# Patient Record
Sex: FEMALE | Race: ASIAN | Employment: UNEMPLOYED | URBAN - METROPOLITAN AREA
[De-identification: names, ages, dates, MRNs, and addresses within clinical notes are randomized per-mention and may not be internally consistent; named-entity substitution may affect disease eponyms.]

---

## 2017-01-03 RX ORDER — LEVOTHYROXINE SODIUM 0.05 MG/1
TABLET ORAL
Qty: 30 TABLET | Refills: 0 | Status: SHIPPED | OUTPATIENT
Start: 2017-01-03 | End: 2017-01-23 | Stop reason: SDUPTHER

## 2017-01-24 RX ORDER — LEVOTHYROXINE SODIUM 0.05 MG/1
TABLET ORAL
Qty: 90 TABLET | Refills: 0 | Status: SHIPPED | OUTPATIENT
Start: 2017-01-24 | End: 2017-05-01 | Stop reason: SDUPTHER

## 2017-02-06 DIAGNOSIS — M17.0 PRIMARY OSTEOARTHRITIS OF BOTH KNEES: Primary | ICD-10-CM

## 2017-02-10 ENCOUNTER — TELEPHONE (OUTPATIENT)
Dept: ORTHOPEDIC SURGERY | Age: 57
End: 2017-02-10

## 2017-02-13 ENCOUNTER — TELEPHONE (OUTPATIENT)
Dept: ORTHOPEDIC SURGERY | Age: 57
End: 2017-02-13

## 2017-02-27 ENCOUNTER — OFFICE VISIT (OUTPATIENT)
Dept: ORTHOPEDIC SURGERY | Age: 57
End: 2017-02-27

## 2017-02-27 DIAGNOSIS — M17.0 PRIMARY OSTEOARTHRITIS OF BOTH KNEES: Primary | ICD-10-CM

## 2017-02-27 PROCEDURE — 20610 DRAIN/INJ JOINT/BURSA W/O US: CPT | Performed by: ORTHOPAEDIC SURGERY

## 2017-03-23 RX ORDER — FLUTICASONE PROPIONATE 50 MCG
SPRAY, SUSPENSION (ML) NASAL
Qty: 16 G | Refills: 1 | Status: SHIPPED | OUTPATIENT
Start: 2017-03-23 | End: 2018-02-23 | Stop reason: SDUPTHER

## 2017-05-01 RX ORDER — LEVOTHYROXINE SODIUM 0.05 MG/1
TABLET ORAL
Qty: 90 TABLET | Refills: 0 | Status: SHIPPED | OUTPATIENT
Start: 2017-05-01 | End: 2017-07-22 | Stop reason: SDUPTHER

## 2017-07-24 RX ORDER — LEVOTHYROXINE SODIUM 0.05 MG/1
TABLET ORAL
Qty: 90 TABLET | Refills: 0 | Status: SHIPPED | OUTPATIENT
Start: 2017-07-24 | End: 2017-10-10 | Stop reason: SDUPTHER

## 2017-08-15 ENCOUNTER — OFFICE VISIT (OUTPATIENT)
Dept: FAMILY MEDICINE CLINIC | Age: 57
End: 2017-08-15

## 2017-08-15 VITALS
SYSTOLIC BLOOD PRESSURE: 118 MMHG | WEIGHT: 153 LBS | DIASTOLIC BLOOD PRESSURE: 84 MMHG | BODY MASS INDEX: 26.26 KG/M2 | HEART RATE: 58 BPM

## 2017-08-15 DIAGNOSIS — M54.6 ACUTE LEFT-SIDED THORACIC BACK PAIN: ICD-10-CM

## 2017-08-15 DIAGNOSIS — M54.6 ACUTE LEFT-SIDED THORACIC BACK PAIN: Primary | ICD-10-CM

## 2017-08-15 LAB
ANION GAP SERPL CALCULATED.3IONS-SCNC: 17 MMOL/L (ref 3–16)
BILIRUBIN, POC: NORMAL
BLOOD URINE, POC: NORMAL
BUN BLDV-MCNC: 16 MG/DL (ref 7–20)
CALCIUM SERPL-MCNC: 9.8 MG/DL (ref 8.3–10.6)
CHLORIDE BLD-SCNC: 102 MMOL/L (ref 99–110)
CLARITY, POC: NORMAL
CO2: 25 MMOL/L (ref 21–32)
COLOR, POC: NORMAL
CREAT SERPL-MCNC: 0.8 MG/DL (ref 0.6–1.1)
GFR AFRICAN AMERICAN: >60
GFR NON-AFRICAN AMERICAN: >60
GLUCOSE BLD-MCNC: 104 MG/DL (ref 70–99)
GLUCOSE URINE, POC: NORMAL
HCT VFR BLD CALC: 37.3 % (ref 36–48)
HEMOGLOBIN: 12.1 G/DL (ref 12–16)
KETONES, POC: NORMAL
LEUKOCYTE EST, POC: NORMAL
MCH RBC QN AUTO: 26.5 PG (ref 26–34)
MCHC RBC AUTO-ENTMCNC: 32.3 G/DL (ref 31–36)
MCV RBC AUTO: 82.2 FL (ref 80–100)
NITRITE, POC: NORMAL
PDW BLD-RTO: 13.6 % (ref 12.4–15.4)
PH, POC: 6.5
PLATELET # BLD: 193 K/UL (ref 135–450)
PMV BLD AUTO: 10.2 FL (ref 5–10.5)
POTASSIUM SERPL-SCNC: 5 MMOL/L (ref 3.5–5.1)
PROTEIN, POC: NORMAL
RBC # BLD: 4.54 M/UL (ref 4–5.2)
SODIUM BLD-SCNC: 144 MMOL/L (ref 136–145)
SPECIFIC GRAVITY, POC: 1.01
UROBILINOGEN, POC: 0.2
WBC # BLD: 4.6 K/UL (ref 4–11)

## 2017-08-15 PROCEDURE — 99214 OFFICE O/P EST MOD 30 MIN: CPT | Performed by: FAMILY MEDICINE

## 2017-08-15 PROCEDURE — 81002 URINALYSIS NONAUTO W/O SCOPE: CPT | Performed by: FAMILY MEDICINE

## 2017-08-21 ASSESSMENT — ENCOUNTER SYMPTOMS
EYES NEGATIVE: 1
RESPIRATORY NEGATIVE: 1
GASTROINTESTINAL NEGATIVE: 1

## 2017-08-22 ENCOUNTER — HOSPITAL ENCOUNTER (OUTPATIENT)
Dept: CT IMAGING | Age: 57
Discharge: HOME | End: 2017-08-22
Attending: FAMILY MEDICINE | Admitting: FAMILY MEDICINE

## 2017-08-22 ENCOUNTER — TELEPHONE (OUTPATIENT)
Dept: FAMILY MEDICINE CLINIC | Age: 57
End: 2017-08-22

## 2017-08-22 DIAGNOSIS — M54.6 ACUTE LEFT-SIDED THORACIC BACK PAIN: ICD-10-CM

## 2017-08-22 DIAGNOSIS — M54.6 PAIN IN THORACIC SPINE: ICD-10-CM

## 2017-08-25 ENCOUNTER — TELEPHONE (OUTPATIENT)
Dept: FAMILY MEDICINE CLINIC | Age: 57
End: 2017-08-25

## 2017-08-28 DIAGNOSIS — M17.0 PRIMARY OSTEOARTHRITIS OF BOTH KNEES: Primary | ICD-10-CM

## 2017-09-22 ENCOUNTER — OFFICE VISIT (OUTPATIENT)
Dept: ORTHOPEDIC SURGERY | Age: 57
End: 2017-09-22

## 2017-09-22 VITALS — BODY MASS INDEX: 25.1 KG/M2 | HEIGHT: 64 IN | WEIGHT: 147 LBS

## 2017-09-22 DIAGNOSIS — M25.562 PAIN IN BOTH KNEES, UNSPECIFIED CHRONICITY: Primary | ICD-10-CM

## 2017-09-22 DIAGNOSIS — M25.561 PAIN IN BOTH KNEES, UNSPECIFIED CHRONICITY: Primary | ICD-10-CM

## 2017-09-22 DIAGNOSIS — M17.0 PRIMARY OSTEOARTHRITIS OF BOTH KNEES: ICD-10-CM

## 2017-09-22 PROCEDURE — 99214 OFFICE O/P EST MOD 30 MIN: CPT | Performed by: ORTHOPAEDIC SURGERY

## 2017-09-22 PROCEDURE — 73564 X-RAY EXAM KNEE 4 OR MORE: CPT | Performed by: ORTHOPAEDIC SURGERY

## 2017-09-28 ENCOUNTER — TELEPHONE (OUTPATIENT)
Dept: ORTHOPEDIC SURGERY | Age: 57
End: 2017-09-28

## 2017-10-03 ENCOUNTER — OFFICE VISIT (OUTPATIENT)
Dept: ORTHOPEDIC SURGERY | Age: 57
End: 2017-10-03

## 2017-10-03 DIAGNOSIS — M17.0 PRIMARY OSTEOARTHRITIS OF BOTH KNEES: Primary | ICD-10-CM

## 2017-10-03 PROCEDURE — 20610 DRAIN/INJ JOINT/BURSA W/O US: CPT | Performed by: ORTHOPAEDIC SURGERY

## 2017-10-03 PROCEDURE — 99999 PR OFFICE/OUTPT VISIT,PROCEDURE ONLY: CPT | Performed by: ORTHOPAEDIC SURGERY

## 2017-10-03 NOTE — PROGRESS NOTES
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 HISTORY OF PRESENT ILLNESS: The patient is here for the 1st Synvisc 1 injection into the left and right knee. PROCEDURE: Under sterile conditions, the patient was injected in the superolateral pouch of the left and right knee with Synvisc 1 prefilled syringe with a 22-gauge needle. The injection was tolerated well. Post-injection precautions were given. PLAN: The patient will follow up with us in clinic in three months to check to see how they responded to the injections.

## 2017-10-03 NOTE — MR AVS SNAPSHOT
After Visit Summary             Dorian Velasquez   10/3/2017 10:10 AM   Office Visit    Description:  Female : 1960   Provider:  Mami Gary MD   Department:  [facility]              Your Follow-Up and Future Appointments         Below is a list of your follow-up and future appointments. This may not be a complete list as you may have made appointments directly with providers that we are not aware of or your providers may have made some for you. Please call your providers to confirm appointments. It is important to keep your appointments. Please bring your current insurance card, photo ID, co-pay, and all medication bottles to your appointment. If self-pay, payment is expected at the time of service. Information from Your Visit        Department     Name Address Phone Fax    [facility] [address]  [address] [address],[address] [facility] [address] [facility] 02 [phone]      You Were Seen for:         Comments    Primary osteoarthritis of both knees   [089520]         Vital Signs     Last Menstrual Period Smoking Status                2014 Never Smoker          Additional Information about your Body Mass Index (BMI)           Your BMI as listed above is considered overweight (25.0-29.9). BMI is an estimate of body fat, calculated from your height and weight. The higher your BMI, the greater your risk of heart disease, high blood pressure, type 2 diabetes, stroke, gallstones, arthritis, sleep apnea, and certain cancers. BMI is not perfect. It may overestimate body fat in athletes and people who are more muscular. If your body fat is high you can improve your BMI by decreasing your calorie intake and becoming more physically active.      Learn more at: redITco.uk             Medications and Orders      Your Current Medications Are levothyroxine (SYNTHROID) 50 MCG tablet TAKE ONE TABLET BY MOUTH DAILY    fluticasone (FLONASE) 50 MCG/ACT nasal spray USE 2 SPRAYS IN EACH NOSTRIL DAILY    omeprazole (PRILOSEC) 40 MG capsule Take 1 capsule by mouth daily Take 1 capsule twice daily, in the morning and 1 hour before dinner    EPINEPHrine (EPIPEN 2-DEVAN) 0.3 MG/0.3ML JESS injection Inject 0.3 mLs into the muscle once for 1 dose. Use as directed for allergic reaction    Calcium Carbonate-Vitamin D (CALCIUM + D PO) Take  by mouth.         Allergies              Latex Hives    Advil [Ibuprofen Micronized]     Aspirin     Demerol     Estrogens Conjugated     Joint pain    Hydrocodone Hives, Swelling    Macrobid [Nitrofurantoin Monohydrate Macrocrystals]     Penicillins     Prednisone     Vicodin [Hydrocodone-acetaminophen]     Zithromax [Azithromycin Dihydrate]     Zyrtec [Cetirizine Hcl]     Iodine Rash      We Ordered/Performed the following           CT ARTHROCENTESIS ASPIR&/INJ MAJOR JT/BURSA W/O US     CT SYNVISC OR SYNVISC-ONE          Additional Information        Basic Information     Date Of Birth Sex Race Ethnicity Preferred Language    1960 Female  Non-/Non  English      Problem List as of 10/3/2017  Date Reviewed: 10/3/2017                Primary osteoarthritis of both knees    Acquired hypothyroidism    Primary localized osteoarthrosis, lower leg    Leg weakness    Medial meniscus tear    Ankle joint effusion    Osteoarthritis, knee    Pain in joint, lower leg    Arthritis    IBS (irritable bowel syndrome)    Raynaud disease    Asthma, currently dormant    Anemia      Immunizations as of 10/3/2017     Name Date    Hepatitis A 4/16/2013    Hepatitis B 4/15/2013    Influenza Virus Vaccine 10/1/2013, 10/28/2011    Influenza, Intradermal, Preservative free 10/16/2015    Tdap (Boostrix, Adacel) 12/30/2013      Preventive Care        Date Due    HIV screening is recommended for all people regardless of risk factors aged 15-65 years at least once (lifetime) who have never been HIV tested. 1/17/1977    Diabetes Screening 1/17/2002    Mammograms are recommended every 2 years for low/average risk patients aged 48 - 69, and every year for high risk patients per updated national guidelines. However these guidelines can be individualized by your provider. 12/30/2016    Yearly Flu Vaccine (1) 9/1/2017    Pap Smear 12/19/2019    Cholesterol Screening 12/29/2019    Tetanus Combination Vaccine (2 - Td) 12/30/2023    Colonoscopy 9/7/2027            DocDept Signup           Our records indicate that you have an active "[employer] account. You can view your After Visit Summary by going to [email or web address] and logging in with your "[employer] username and password. If you don't have a "[employer] username and password but a parent or guardian has access to your record, the parent or guardian should login with their own "[employer] username and password and access your record to view the After Visit Summary. Additional Information  If you have questions, please contact the physician practice where you receive care. Remember, "[employer] is NOT to be used for urgent needs. For medical emergencies, dial 911. For questions regarding your "[employer] account call [phone]. If you have a clinical question, please call your doctor's office.

## 2017-10-10 RX ORDER — LEVOTHYROXINE SODIUM 0.05 MG/1
TABLET ORAL
Qty: 90 TABLET | Refills: 0 | Status: SHIPPED | OUTPATIENT
Start: 2017-10-10 | End: 2018-01-08 | Stop reason: SDUPTHER

## 2017-11-03 ENCOUNTER — TELEPHONE (OUTPATIENT)
Dept: FAMILY MEDICINE CLINIC | Age: 57
End: 2017-11-03

## 2017-11-03 NOTE — TELEPHONE ENCOUNTER
Pt had a CT of the Abdomen. She received a bill from [facility] [employer]..  She says that [facility] did not pay for any of it. Did our office send a PA for the CT?     Last office visit 8/15/17

## 2017-11-06 NOTE — TELEPHONE ENCOUNTER
You can call [facility] and ask for the precert dept. They should be able to help you.

## 2018-01-04 ENCOUNTER — TELEPHONE (OUTPATIENT)
Dept: ORTHOPEDIC SURGERY | Age: 58
End: 2018-01-04

## 2018-01-04 NOTE — TELEPHONE ENCOUNTER
Scanned complete [facility] medical records under Media tab for patient pick-up @ [facility]. Patient is also picking up an X-Ray/MRI CD.

## 2018-01-08 RX ORDER — LEVOTHYROXINE SODIUM 0.05 MG/1
TABLET ORAL
Qty: 90 TABLET | Refills: 0 | Status: SHIPPED | OUTPATIENT
Start: 2018-01-08 | End: 2018-04-17 | Stop reason: SDUPTHER

## 2018-02-05 ENCOUNTER — TELEPHONE (OUTPATIENT)
Dept: FAMILY MEDICINE CLINIC | Age: 58
End: 2018-02-05

## 2018-02-07 ENCOUNTER — OFFICE VISIT (OUTPATIENT)
Dept: FAMILY MEDICINE CLINIC | Age: 58
End: 2018-02-07

## 2018-02-07 VITALS
SYSTOLIC BLOOD PRESSURE: 146 MMHG | HEART RATE: 71 BPM | BODY MASS INDEX: 26.46 KG/M2 | DIASTOLIC BLOOD PRESSURE: 90 MMHG | WEIGHT: 155 LBS | TEMPERATURE: 98.6 F

## 2018-02-07 DIAGNOSIS — J40 BRONCHITIS: Primary | ICD-10-CM

## 2018-02-07 DIAGNOSIS — K52.9 ENTERITIS: ICD-10-CM

## 2018-02-07 LAB
BASOPHILS ABSOLUTE: 0 K/UL (ref 0–0.2)
BASOPHILS RELATIVE PERCENT: 0.4 %
EOSINOPHILS ABSOLUTE: 0 K/UL (ref 0–0.6)
EOSINOPHILS RELATIVE PERCENT: 0.7 %
HCT VFR BLD CALC: 39.3 % (ref 36–48)
HEMOGLOBIN: 12.7 G/DL (ref 12–16)
LYMPHOCYTES ABSOLUTE: 2.7 K/UL (ref 1–5.1)
LYMPHOCYTES RELATIVE PERCENT: 60 %
MCH RBC QN AUTO: 26.3 PG (ref 26–34)
MCHC RBC AUTO-ENTMCNC: 32.2 G/DL (ref 31–36)
MCV RBC AUTO: 81.5 FL (ref 80–100)
MONOCYTES ABSOLUTE: 0.5 K/UL (ref 0–1.3)
MONOCYTES RELATIVE PERCENT: 10.5 %
NEUTROPHILS ABSOLUTE: 1.3 K/UL (ref 1.7–7.7)
NEUTROPHILS RELATIVE PERCENT: 28.4 %
PDW BLD-RTO: 14.1 % (ref 12.4–15.4)
PLATELET # BLD: 181 K/UL (ref 135–450)
PMV BLD AUTO: 10.2 FL (ref 5–10.5)
RBC # BLD: 4.82 M/UL (ref 4–5.2)
WBC # BLD: 4.4 K/UL (ref 4–11)

## 2018-02-07 PROCEDURE — 99213 OFFICE O/P EST LOW 20 MIN: CPT | Performed by: FAMILY MEDICINE

## 2018-02-07 RX ORDER — BENZONATATE 200 MG/1
200 CAPSULE ORAL 3 TIMES DAILY PRN
Qty: 30 CAPSULE | Refills: 0 | Status: SHIPPED | OUTPATIENT
Start: 2018-02-07 | End: 2018-02-14

## 2018-02-09 ENCOUNTER — TELEPHONE (OUTPATIENT)
Dept: FAMILY MEDICINE CLINIC | Age: 58
End: 2018-02-09

## 2018-02-09 DIAGNOSIS — K52.9 ENTERITIS: Primary | ICD-10-CM

## 2018-02-19 ENCOUNTER — TELEPHONE (OUTPATIENT)
Dept: FAMILY MEDICINE CLINIC | Age: 58
End: 2018-02-19

## 2018-02-19 DIAGNOSIS — R19.7 DIARRHEA, UNSPECIFIED TYPE: Primary | ICD-10-CM

## 2018-02-19 LAB
ANAEROBIC CULTURE: NORMAL
WOUND/ABSCESS: NORMAL

## 2018-02-19 NOTE — TELEPHONE ENCOUNTER
Not sure what is she talking about, I don't see an order from  for stool culture , I didn't see the pt

## 2018-02-19 NOTE — TELEPHONE ENCOUNTER
They only have orders for tissue samples. Please send over orders for the stool cultures that were sent to them.     Last office visit 2/7/18

## 2018-02-20 ENCOUNTER — TELEPHONE (OUTPATIENT)
Dept: FAMILY MEDICINE CLINIC | Age: 58
End: 2018-02-20

## 2018-02-20 LAB
CRYPTOSPORIDIUM ANTIGEN STOOL: NORMAL
E HISTOLYTICA ANTIGEN STOOL: NORMAL
GI BACTERIAL PATHOGENS BY PCR: NORMAL
GIARDIA ANTIGEN STOOL: NORMAL
REASON FOR REJECTION: NORMAL
REJECTED TEST: NORMAL
WHITE BLOOD CELLS (WBC), STOOL: NORMAL

## 2018-02-23 RX ORDER — FLUTICASONE PROPIONATE 50 MCG
SPRAY, SUSPENSION (ML) NASAL
Qty: 16 G | Refills: 1 | Status: SHIPPED | OUTPATIENT
Start: 2018-02-23 | End: 2019-12-26 | Stop reason: SDUPTHER

## 2018-03-08 ENCOUNTER — TELEPHONE (OUTPATIENT)
Dept: FAMILY MEDICINE CLINIC | Age: 58
End: 2018-03-08

## 2018-03-08 NOTE — TELEPHONE ENCOUNTER
Patient calling to advise that she is doing much better, and she took 2 stool samples and they came back ok.  If things changes she will contact the office

## 2018-03-19 ENCOUNTER — TELEPHONE (OUTPATIENT)
Dept: FAMILY MEDICINE CLINIC | Age: 58
End: 2018-03-19

## 2018-03-19 RX ORDER — FLUCONAZOLE 150 MG/1
150 TABLET ORAL ONCE
Qty: 1 TABLET | Refills: 0 | Status: SHIPPED | OUTPATIENT
Start: 2018-03-19 | End: 2018-03-19

## 2018-04-17 RX ORDER — LEVOTHYROXINE SODIUM 0.05 MG/1
TABLET ORAL
Qty: 90 TABLET | Refills: 0 | Status: SHIPPED | OUTPATIENT
Start: 2018-04-17 | End: 2018-07-10 | Stop reason: SDUPTHER

## 2018-05-16 ENCOUNTER — TELEPHONE (OUTPATIENT)
Dept: FAMILY MEDICINE CLINIC | Age: 58
End: 2018-05-16

## 2018-07-10 RX ORDER — LEVOTHYROXINE SODIUM 0.05 MG/1
TABLET ORAL
Qty: 90 TABLET | Refills: 0 | Status: SHIPPED | OUTPATIENT
Start: 2018-07-10 | End: 2018-07-26 | Stop reason: SDUPTHER

## 2018-07-20 ENCOUNTER — TELEPHONE (OUTPATIENT)
Dept: FAMILY MEDICINE CLINIC | Age: 58
End: 2018-07-20

## 2018-07-20 DIAGNOSIS — E03.9 ACQUIRED HYPOTHYROIDISM: Primary | ICD-10-CM

## 2018-07-24 DIAGNOSIS — E03.9 ACQUIRED HYPOTHYROIDISM: ICD-10-CM

## 2018-07-24 LAB — TSH SERPL DL<=0.05 MIU/L-ACNC: 1.53 UIU/ML (ref 0.27–4.2)

## 2018-07-26 ENCOUNTER — TELEPHONE (OUTPATIENT)
Dept: FAMILY MEDICINE CLINIC | Age: 58
End: 2018-07-26

## 2018-07-26 ENCOUNTER — OFFICE VISIT (OUTPATIENT)
Dept: FAMILY MEDICINE CLINIC | Age: 58
End: 2018-07-26

## 2018-07-26 VITALS
HEART RATE: 68 BPM | WEIGHT: 155.6 LBS | BODY MASS INDEX: 26.56 KG/M2 | OXYGEN SATURATION: 98 % | SYSTOLIC BLOOD PRESSURE: 158 MMHG | DIASTOLIC BLOOD PRESSURE: 90 MMHG | RESPIRATION RATE: 14 BRPM

## 2018-07-26 DIAGNOSIS — R03.0 PREHYPERTENSION: ICD-10-CM

## 2018-07-26 DIAGNOSIS — Z23 NEED FOR PROPHYLACTIC VACCINATION AND INOCULATION AGAINST VARICELLA: ICD-10-CM

## 2018-07-26 DIAGNOSIS — E03.9 ACQUIRED HYPOTHYROIDISM: Primary | ICD-10-CM

## 2018-07-26 PROCEDURE — 99213 OFFICE O/P EST LOW 20 MIN: CPT | Performed by: FAMILY MEDICINE

## 2018-07-26 RX ORDER — LEVOTHYROXINE SODIUM 0.05 MG/1
50 TABLET ORAL DAILY
Qty: 90 TABLET | Refills: 2 | Status: SHIPPED | OUTPATIENT
Start: 2018-07-26 | End: 2019-04-23 | Stop reason: SDUPTHER

## 2018-07-26 ASSESSMENT — PATIENT HEALTH QUESTIONNAIRE - PHQ9
2. FEELING DOWN, DEPRESSED OR HOPELESS: 0
1. LITTLE INTEREST OR PLEASURE IN DOING THINGS: 0
SUM OF ALL RESPONSES TO PHQ9 QUESTIONS 1 & 2: 0
SUM OF ALL RESPONSES TO PHQ QUESTIONS 1-9: 0

## 2018-07-26 NOTE — TELEPHONE ENCOUNTER
Pt wants to know if she should cancel her appt today she stated that she got her labs back and her TSH is normal range so she wanted to ask if she should now cancel today at 11:15 stated that the medication is working well.   Please call her at [phone]

## 2018-07-26 NOTE — PATIENT INSTRUCTIONS
problems. It's also a good idea to know your test results and keep a list of the medicines you take. How can you care for yourself at home? Following the DASH diet  · Eat 4 to 5 servings of fruit each day. A serving is 1 medium-sized piece of fruit, ½ cup chopped or canned fruit, 1/4 cup dried fruit, or 4 ounces (½ cup) of fruit juice. Choose fruit more often than fruit juice. · Eat 4 to 5 servings of vegetables each day. A serving is 1 cup of lettuce or raw leafy vegetables, ½ cup of chopped or cooked vegetables, or 4 ounces (½ cup) of vegetable juice. Choose vegetables more often than vegetable juice. · Get 2 to 3 servings of low-fat and fat-free dairy each day. A serving is 8 ounces of milk, 1 cup of yogurt, or 1 ½ ounces of cheese. · Eat 6 to 8 servings of grains each day. A serving is 1 slice of bread, 1 ounce of dry cereal, or ½ cup of cooked rice, pasta, or cooked cereal. Try to choose whole-grain products as much as possible. · Limit lean meat, poultry, and fish to 2 servings each day. A serving is 3 ounces, about the size of a deck of cards. · Eat 4 to 5 servings of nuts, seeds, and legumes (cooked dried beans, lentils, and split peas) each week. A serving is 1/3 cup of nuts, 2 tablespoons of seeds, or ½ cup of cooked beans or peas. · Limit fats and oils to 2 to 3 servings each day. A serving is 1 teaspoon of vegetable oil or 2 tablespoons of salad dressing. · Limit sweets and added sugars to 5 servings or less a week. A serving is 1 tablespoon jelly or jam, ½ cup sorbet, or 1 cup of lemonade. · Eat less than 2,300 milligrams (mg) of sodium a day. If you limit your sodium to 1,500 mg a day, you can lower your blood pressure even more. Tips for success  · Start small. Do not try to make dramatic changes to your diet all at once. You might feel that you are missing out on your favorite foods and then be more likely to not follow the plan. Make small changes, and stick with them.  Once those changes

## 2018-07-26 NOTE — PROGRESS NOTES
History Main Topics    Smoking status: Never Smoker    Smokeless tobacco: Never Used    Alcohol use No    Drug use: No    Sexual activity: Not Asked     Other Topics Concern    None     Social History Narrative    None     Current Outpatient Prescriptions   Medication Sig Dispense Refill    zoster recombinant adjuvanted vaccine (SHINGRIX) 50 MCG SUSR injection 50 MCG IM then repeat 2-6 months. 0.5 mL 1    levothyroxine (SYNTHROID) 50 MCG tablet Take 1 tablet by mouth Daily 90 tablet 2    fluticasone (FLONASE) 50 MCG/ACT nasal spray USE 2 SPRAYS IN EACH NOSTRIL DAILY 16 g 1    Calcium Carbonate-Vitamin D (CALCIUM + D PO) Take  by mouth.  omeprazole (PRILOSEC) 40 MG capsule Take 1 capsule by mouth daily Take 1 capsule twice daily, in the morning and 1 hour before dinner 60 capsule 2    EPINEPHrine (EPIPEN 2-DEVAN) 0.3 MG/0.3ML JESS injection Inject 0.3 mLs into the muscle once for 1 dose. Use as directed for allergic reaction 2 Device 1     No current facility-administered medications for this visit. Allergies   Allergen Reactions    Latex Hives    Advil [Ibuprofen Micronized]     Aspirin     Demerol     Estrogens Conjugated      Joint pain      Hydrocodone Hives and Swelling    Macrobid [Nitrofurantoin Monohydrate Macrocrystals]     Penicillins     Prednisone     Vicodin [Hydrocodone-Acetaminophen]     Zithromax [Azithromycin Dihydrate]     Zyrtec [Cetirizine Hcl]     Iodine Rash       Review of Systems   Constitutional: Negative. HENT: Negative. Eyes: Negative. Respiratory: Negative. Cardiovascular: Negative. Gastrointestinal: Negative. All other systems reviewed and are negative. OBJECTIVE:  BP (!) 158/90 (Site: Left Arm, Position: Sitting, Cuff Size: Small Adult)   Pulse 68   Resp 14   Wt 155 lb 9.6 oz (70.6 kg)   LMP 01/23/2014   SpO2 98%   Breastfeeding?  No   BMI 26.56 kg/m²     Physical Exam   Constitutional: She is oriented to person, place, and time. She appears well-developed and well-nourished. HENT:   Mouth/Throat: Oropharynx is clear and moist.   Eyes: Conjunctivae are normal. No scleral icterus. Neck: No JVD present. No thyromegaly present. Cardiovascular: Normal rate, regular rhythm, normal heart sounds and intact distal pulses. Exam reveals no gallop and no friction rub. No murmur heard. Pulmonary/Chest: Effort normal and breath sounds normal. She has no wheezes. She has no rales. Abdominal: Soft. Bowel sounds are normal. She exhibits no distension and no mass. There is no hepatosplenomegaly. There is no tenderness. No hernia. Musculoskeletal: She exhibits no edema. Lymphadenopathy:     She has no cervical adenopathy. Neurological: She is alert and oriented to person, place, and time. Skin: No rash noted. Psychiatric: She has a normal mood and affect. Vitals reviewed. ASSESSMENT:   Diagnosis Orders   1. Acquired hypothyroidism     2. Need for prophylactic vaccination and inoculation against varicella  zoster recombinant adjuvanted vaccine (SHINGRIX) 50 MCG SUSR injection   3.  Prehypertension         PLAN:    See orders, monitor blood pressure low sodium intake ambulatory recheck close follow-up if needed  Information is given  Continue the same with thyroid medication

## 2018-07-27 ENCOUNTER — TELEPHONE (OUTPATIENT)
Dept: FAMILY MEDICINE CLINIC | Age: 58
End: 2018-07-27

## 2018-07-27 ASSESSMENT — ENCOUNTER SYMPTOMS
EYES NEGATIVE: 1
RESPIRATORY NEGATIVE: 1
GASTROINTESTINAL NEGATIVE: 1

## 2018-07-27 NOTE — TELEPHONE ENCOUNTER
Pt was in yesterday and she was told she has HTN. She is calling with her BP reading today. Pt is calling today with her reading. It is 159/95. She is not sure how often she is to call with readings. Let her know please.     LOV 7/27/18

## 2018-08-07 ENCOUNTER — OFFICE VISIT (OUTPATIENT)
Dept: FAMILY MEDICINE CLINIC | Age: 58
End: 2018-08-07

## 2018-08-07 VITALS
TEMPERATURE: 98.2 F | OXYGEN SATURATION: 98 % | BODY MASS INDEX: 26.63 KG/M2 | DIASTOLIC BLOOD PRESSURE: 86 MMHG | RESPIRATION RATE: 14 BRPM | WEIGHT: 156 LBS | SYSTOLIC BLOOD PRESSURE: 138 MMHG | HEART RATE: 62 BPM

## 2018-08-07 DIAGNOSIS — H65.92 OME (OTITIS MEDIA WITH EFFUSION), LEFT: ICD-10-CM

## 2018-08-07 DIAGNOSIS — J01.00 ACUTE NON-RECURRENT MAXILLARY SINUSITIS: Primary | ICD-10-CM

## 2018-08-07 PROCEDURE — 99214 OFFICE O/P EST MOD 30 MIN: CPT | Performed by: FAMILY MEDICINE

## 2018-08-07 RX ORDER — CEFDINIR 300 MG/1
300 CAPSULE ORAL 2 TIMES DAILY
Qty: 20 CAPSULE | Refills: 0 | Status: SHIPPED | OUTPATIENT
Start: 2018-08-07 | End: 2018-08-17

## 2018-08-09 RX ORDER — AMOXICILLIN AND CLAVULANATE POTASSIUM 875; 125 MG/1; MG/1
1 TABLET, FILM COATED ORAL 2 TIMES DAILY
Qty: 20 TABLET | Refills: 0 | Status: SHIPPED | OUTPATIENT
Start: 2018-08-09 | End: 2018-08-19

## 2018-09-26 ENCOUNTER — NURSE ONLY (OUTPATIENT)
Dept: FAMILY MEDICINE CLINIC | Age: 58
End: 2018-09-26
Payer: COMMERCIAL

## 2018-09-26 DIAGNOSIS — Z23 NEED FOR INFLUENZA VACCINATION: Primary | ICD-10-CM

## 2018-09-26 PROCEDURE — 90471 IMMUNIZATION ADMIN: CPT | Performed by: FAMILY MEDICINE

## 2018-09-26 PROCEDURE — 90686 IIV4 VACC NO PRSV 0.5 ML IM: CPT | Performed by: FAMILY MEDICINE

## 2018-11-12 ENCOUNTER — OFFICE VISIT (OUTPATIENT)
Dept: FAMILY MEDICINE CLINIC | Age: 58
End: 2018-11-12
Payer: COMMERCIAL

## 2018-11-12 VITALS
DIASTOLIC BLOOD PRESSURE: 80 MMHG | WEIGHT: 159.8 LBS | HEART RATE: 59 BPM | RESPIRATION RATE: 14 BRPM | OXYGEN SATURATION: 98 % | TEMPERATURE: 98.4 F | SYSTOLIC BLOOD PRESSURE: 130 MMHG | BODY MASS INDEX: 27.28 KG/M2

## 2018-11-12 DIAGNOSIS — J30.9 ALLERGIC RHINITIS, UNSPECIFIED SEASONALITY, UNSPECIFIED TRIGGER: ICD-10-CM

## 2018-11-12 DIAGNOSIS — H66.90 ACUTE OTITIS MEDIA, UNSPECIFIED OTITIS MEDIA TYPE: Primary | ICD-10-CM

## 2018-11-12 PROCEDURE — 99214 OFFICE O/P EST MOD 30 MIN: CPT | Performed by: FAMILY MEDICINE

## 2018-11-12 RX ORDER — AMOXICILLIN AND CLAVULANATE POTASSIUM 875; 125 MG/1; MG/1
1 TABLET, FILM COATED ORAL 2 TIMES DAILY
Qty: 20 TABLET | Refills: 0 | Status: SHIPPED | OUTPATIENT
Start: 2018-11-12 | End: 2018-11-22

## 2018-11-12 RX ORDER — FLUTICASONE PROPIONATE 50 MCG
2 SPRAY, SUSPENSION (ML) NASAL DAILY
Qty: 1 BOTTLE | Refills: 1 | Status: SHIPPED | OUTPATIENT
Start: 2018-11-12 | End: 2019-06-06

## 2019-04-11 ENCOUNTER — TELEPHONE (OUTPATIENT)
Dept: FAMILY MEDICINE CLINIC | Age: 59
End: 2019-04-11

## 2019-04-11 NOTE — TELEPHONE ENCOUNTER
Pt is going to travel [facility] in June she will be there for 2 month   She needs to know what if any immunizations she will need ? And   She stated that she has been in [place] for 31 years so her immunity may be different then thiers she is from [facility] but she is concerned about being there for 2 months   Drinking Water is that ok to drink? Pt did scheduled a check up just before she leaves. please advise.

## 2019-04-11 NOTE — TELEPHONE ENCOUNTER
She should check the CDC website to see if she needs any immunization, differs do to the area she is traveling to, we'll discuss in further detail when she comes for her follow-up  she should check that website sooner

## 2019-04-11 NOTE — TELEPHONE ENCOUNTER
[phone] (home)   Spoke with patient advised per  6 Zay Gilliland to check CDC website, check prior to OV, can discuss at visit

## 2019-04-23 ENCOUNTER — OFFICE VISIT (OUTPATIENT)
Dept: FAMILY MEDICINE CLINIC | Age: 59
End: 2019-04-23
Payer: COMMERCIAL

## 2019-04-23 VITALS
WEIGHT: 164 LBS | HEART RATE: 69 BPM | BODY MASS INDEX: 28 KG/M2 | SYSTOLIC BLOOD PRESSURE: 118 MMHG | OXYGEN SATURATION: 99 % | DIASTOLIC BLOOD PRESSURE: 68 MMHG

## 2019-04-23 DIAGNOSIS — J30.2 SEASONAL ALLERGIC RHINITIS, UNSPECIFIED TRIGGER: ICD-10-CM

## 2019-04-23 DIAGNOSIS — H66.002 ACUTE SUPPURATIVE OTITIS MEDIA OF LEFT EAR WITHOUT SPONTANEOUS RUPTURE OF TYMPANIC MEMBRANE, RECURRENCE NOT SPECIFIED: Primary | ICD-10-CM

## 2019-04-23 PROCEDURE — 99214 OFFICE O/P EST MOD 30 MIN: CPT | Performed by: FAMILY MEDICINE

## 2019-04-23 RX ORDER — LEVOTHYROXINE SODIUM 0.05 MG/1
50 TABLET ORAL DAILY
Qty: 90 TABLET | Refills: 2 | Status: SHIPPED | OUTPATIENT
Start: 2019-04-23 | End: 2019-06-06 | Stop reason: SDUPTHER

## 2019-04-23 RX ORDER — LEVALBUTEROL TARTRATE 45 UG/1
2 AEROSOL, METERED ORAL EVERY 4 HOURS PRN
Qty: 1 INHALER | Refills: 0 | Status: SHIPPED | OUTPATIENT
Start: 2019-04-23 | End: 2019-12-27 | Stop reason: SDUPTHER

## 2019-04-23 RX ORDER — AMOXICILLIN AND CLAVULANATE POTASSIUM 875; 125 MG/1; MG/1
1 TABLET, FILM COATED ORAL 2 TIMES DAILY
Qty: 20 TABLET | Refills: 0 | Status: SHIPPED | OUTPATIENT
Start: 2019-04-23 | End: 2019-07-30 | Stop reason: SDUPTHER

## 2019-04-23 ASSESSMENT — PATIENT HEALTH QUESTIONNAIRE - PHQ9
SUM OF ALL RESPONSES TO PHQ9 QUESTIONS 1 & 2: 0
SUM OF ALL RESPONSES TO PHQ QUESTIONS 1-9: 0
2. FEELING DOWN, DEPRESSED OR HOPELESS: 0
SUM OF ALL RESPONSES TO PHQ QUESTIONS 1-9: 0
1. LITTLE INTEREST OR PLEASURE IN DOING THINGS: 0

## 2019-04-23 NOTE — PROGRESS NOTES
Subjective:       History was provided by the patient. Samir Llanos is a 64 y.o. female who presents for evaluation of symptoms of a URI. Symptoms include dry cough, left ear blockage, pain and pressure, headache, nasal blockage, post nasal drip, sinus and nasal congestion, bilateral sinus pain and sore throat. Onset of symptoms was 10 days ago, gradually worsening since that time. Associated symptoms include left ear pressure/pain, achiness, congestion, no  fever, post nasal drip and sinus pressure. She is drinking plenty of fluids. Evaluation to date: none.  Treatment to date: none  Past Medical History:   Diagnosis Date    Asthma     Capsulitis of shoulder 2012    left shoulder, Dr Sampson Epley Hypothyroid     IBS (irritable bowel syndrome)     Nausea & vomiting     Raynaud's disease     to feet     Patient Active Problem List    Diagnosis Date Noted    Primary osteoarthritis of both knees 06/15/2016    Acquired hypothyroidism 2016    Primary localized osteoarthrosis, lower leg 2014    Leg weakness 04/10/2014    Medial meniscus tear 2014    Ankle joint effusion 2014    Osteoarthritis, knee 2014    Pain in joint, lower leg 2014    Arthritis 2013    IBS (irritable bowel syndrome) 10/24/2012    Raynaud disease 2010    Asthma, currently dormant 2010    Anemia 2010     Past Surgical History:   Procedure Laterality Date     SECTION      COLONOSCOPY      Dr Leah Alvarado ARTHROSCOPY Left 2014    LEFT KNEE ARTHROSCOPE WITH PARTIAL MEDIAL MENISCECTOMY, SYNOVECTOMY AND CHONDROPLASTY                                SHOULDER ARTHROSCOPY  2012    left shoulder arthroscopy with subacromial decompression , distal clavicle excision ,synovectomy,labral debridement    SHOULDER ARTHROSCOPY  10/16/2012    RIGHT SHOULDER ARTHROSCOPE, SUBACROMIAL DECOMPRESSION, DISTAL    UPPER GASTROINTESTINAL ENDOSCOPY   Dr Yessenia Moreno     Family History   Problem Relation Age of Onset    Diabetes Mother     High Blood Pressure Mother     High Blood Pressure Father     Heart Disease Father     High Blood Pressure Maternal Grandfather      Social History     Socioeconomic History    Marital status:      Spouse name: Not on file    Number of children: Not on file    Years of education: Not on file    Highest education level: Not on file   Occupational History    Not on file   Social Needs    Financial resource strain: Not on file    Food insecurity:     Worry: Not on file     Inability: Not on file    Transportation needs:     Medical: Not on file     Non-medical: Not on file   Tobacco Use    Smoking status: Never Smoker    Smokeless tobacco: Never Used   Substance and Sexual Activity    Alcohol use: No    Drug use: No    Sexual activity: Not on file   Lifestyle    Physical activity:     Days per week: Not on file     Minutes per session: Not on file    Stress: Not on file   Relationships    Social connections:     Talks on phone: Not on file     Gets together: Not on file     Attends Gnosticist service: Not on file     Active member of club or organization: Not on file     Attends meetings of clubs or organizations: Not on file     Relationship status: Not on file    Intimate partner violence:     Fear of current or ex partner: Not on file     Emotionally abused: Not on file     Physically abused: Not on file     Forced sexual activity: Not on file   Other Topics Concern    Not on file   Social History Narrative    Not on file     Current Outpatient Medications   Medication Sig Dispense Refill    fluticasone (FLONASE) 50 MCG/ACT nasal spray 2 sprays by Nasal route daily 1 Bottle 1    levothyroxine (SYNTHROID) 50 MCG tablet Take 1 tablet by mouth Daily 90 tablet 2    fluticasone (FLONASE) 50 MCG/ACT nasal spray USE 2 SPRAYS IN EACH NOSTRIL DAILY 16 g 1    Calcium Carbonate-Vitamin D (CALCIUM + D PO) Take by mouth.  zoster recombinant adjuvanted vaccine (SHINGRIX) 50 MCG SUSR injection 50 MCG IM then repeat 2-6 months. 0.5 mL 1    omeprazole (PRILOSEC) 40 MG capsule Take 1 capsule by mouth daily Take 1 capsule twice daily, in the morning and 1 hour before dinner 60 capsule 2    EPINEPHrine (EPIPEN 2-DEVAN) 0.3 MG/0.3ML JESS injection Inject 0.3 mLs into the muscle once for 1 dose. Use as directed for allergic reaction 2 Device 1     No current facility-administered medications for this visit. Current Outpatient Medications on File Prior to Visit   Medication Sig Dispense Refill    fluticasone (FLONASE) 50 MCG/ACT nasal spray 2 sprays by Nasal route daily 1 Bottle 1    levothyroxine (SYNTHROID) 50 MCG tablet Take 1 tablet by mouth Daily 90 tablet 2    fluticasone (FLONASE) 50 MCG/ACT nasal spray USE 2 SPRAYS IN EACH NOSTRIL DAILY 16 g 1    Calcium Carbonate-Vitamin D (CALCIUM + D PO) Take  by mouth.  zoster recombinant adjuvanted vaccine (SHINGRIX) 50 MCG SUSR injection 50 MCG IM then repeat 2-6 months. 0.5 mL 1    omeprazole (PRILOSEC) 40 MG capsule Take 1 capsule by mouth daily Take 1 capsule twice daily, in the morning and 1 hour before dinner 60 capsule 2    EPINEPHrine (EPIPEN 2-DEVAN) 0.3 MG/0.3ML JESS injection Inject 0.3 mLs into the muscle once for 1 dose. Use as directed for allergic reaction 2 Device 1     No current facility-administered medications on file prior to visit. Allergies   Allergen Reactions    Latex Hives    Advil [Ibuprofen Micronized]     Aspirin     Demerol     Estrogens Conjugated      Joint pain      Hydrocodone Hives and Swelling    Macrobid [Nitrofurantoin Monohydrate Macrocrystals]     Prednisone     Vicodin [Hydrocodone-Acetaminophen]     Zithromax [Azithromycin Dihydrate]     Zyrtec [Cetirizine Hcl]     Iodine Rash       Review of Systems  Pertinent items are noted in HPI.       Objective:      /68 (Site: Left Upper Arm, Position: Sitting, Cuff Size: Medium Adult)   Pulse 69   Wt 164 lb (74.4 kg)   LMP 01/23/2014   SpO2 99%   BMI 28.00 kg/m²   General appearance: alert, appears stated age and cooperative  Head: Normocephalic, without obvious abnormality, atraumatic  Eyes: conjunctivae/corneas clear. PERRL, EOM's intact. Fundi benign. Ears: normal TM and external ear canal right ear and abnormal TM right ear - erythematous and dull  Nose: Nares normal. Septum midline. Mucosa normal. No drainage or sinus tenderness. , moderate congestion  Throat: lips, mucosa, and tongue normal; teeth and gums normal  Neck: no adenopathy, no carotid bruit, no JVD, supple, symmetrical, trachea midline and thyroid not enlarged, symmetric, no tenderness/mass/nodules  Lungs: clear to auscultation bilaterally  Heart: regular rate and rhythm, S1, S2 normal, no murmur, click, rub or gallop         Assessment:      otitis media and allergic rhinitis      Plan:      Suggested symptomatic OTC remedies. Nasal saline sprays for congestion. RTC prn.     See orders

## 2019-05-20 ENCOUNTER — TELEPHONE (OUTPATIENT)
Dept: FAMILY MEDICINE CLINIC | Age: 59
End: 2019-05-20

## 2019-06-03 NOTE — TELEPHONE ENCOUNTER
Pt is leaving [facility] on June 11th. She is asking for a refill of Levothyroxine 50 mcg. She is stating she will not have enough pills to get her through to when she comes back home. Pt informed it has been too long since she has been here for a thyroid check.  Pt scheduled for 6/11/19

## 2019-06-06 ENCOUNTER — OFFICE VISIT (OUTPATIENT)
Dept: FAMILY MEDICINE CLINIC | Age: 59
End: 2019-06-06
Payer: COMMERCIAL

## 2019-06-06 VITALS
DIASTOLIC BLOOD PRESSURE: 98 MMHG | WEIGHT: 154 LBS | BODY MASS INDEX: 26.29 KG/M2 | RESPIRATION RATE: 12 BRPM | OXYGEN SATURATION: 98 % | HEART RATE: 74 BPM | SYSTOLIC BLOOD PRESSURE: 138 MMHG

## 2019-06-06 DIAGNOSIS — J30.9 ALLERGIC RHINITIS, UNSPECIFIED SEASONALITY, UNSPECIFIED TRIGGER: ICD-10-CM

## 2019-06-06 DIAGNOSIS — I10 ESSENTIAL HYPERTENSION: ICD-10-CM

## 2019-06-06 DIAGNOSIS — E03.9 ACQUIRED HYPOTHYROIDISM: Primary | ICD-10-CM

## 2019-06-06 LAB — TSH SERPL DL<=0.05 MIU/L-ACNC: 1.52 UIU/ML (ref 0.27–4.2)

## 2019-06-06 PROCEDURE — 99213 OFFICE O/P EST LOW 20 MIN: CPT | Performed by: FAMILY MEDICINE

## 2019-06-06 PROCEDURE — 36415 COLL VENOUS BLD VENIPUNCTURE: CPT | Performed by: FAMILY MEDICINE

## 2019-06-06 RX ORDER — FLUTICASONE PROPIONATE 50 MCG
SPRAY, SUSPENSION (ML) NASAL
Qty: 1 BOTTLE | Refills: 0 | Status: SHIPPED | OUTPATIENT
Start: 2019-06-06 | End: 2019-09-12

## 2019-06-06 RX ORDER — LEVOTHYROXINE SODIUM 0.05 MG/1
50 TABLET ORAL DAILY
Qty: 90 TABLET | Refills: 1 | Status: SHIPPED | OUTPATIENT
Start: 2019-06-06 | End: 2019-12-12 | Stop reason: SDUPTHER

## 2019-06-06 ASSESSMENT — ENCOUNTER SYMPTOMS
GASTROINTESTINAL NEGATIVE: 1
RESPIRATORY NEGATIVE: 1
EYES NEGATIVE: 1

## 2019-06-06 NOTE — PROGRESS NOTES
SUBJECTIVE:  Patient ID: Lashanda Hammond is a 64 y.o. y.o. female     HPI   Hypothyroidism: Patient presents for follow up on thyroid function. Symptoms consist of denies fatigue, weight changes, heat/cold intolerance, bowel/skin changes or CVS symptoms. Symptoms have present for several years. The symptoms are no. The problem has been stable. Previous thyroid studies include TSH. The hypothyroidism is due to hypothyroidism.   She is traveling she'll be out to her for several months she wants an early refill of her thyroid medication she is doing well on the current dose  Pt with abnormal BP reading today, feels well low she admits to family history of high blood pressure she is going through some stressful time she lost a sister and malaise and she is traveling the year so she is very busy she is not interesting*medication now even though her blood pressure has a few abnormal reading  Past Medical History:   Diagnosis Date    Asthma     Capsulitis of shoulder 2012    left shoulder, Dr Sonja Jarquin Hypothyroid     IBS (irritable bowel syndrome) 2007    Nausea & vomiting     Raynaud's disease 2002    to feet      Past Surgical History:   Procedure Laterality Date     SECTION      COLONOSCOPY      Dr Perfecto Moreno ARTHROSCOPY Left 2014    LEFT KNEE ARTHROSCOPE WITH PARTIAL MEDIAL MENISCECTOMY, SYNOVECTOMY AND CHONDROPLASTY                                SHOULDER ARTHROSCOPY  2012    left shoulder arthroscopy with subacromial decompression , distal clavicle excision ,synovectomy,labral debridement    SHOULDER ARTHROSCOPY  10/16/2012    RIGHT SHOULDER ARTHROSCOPE, SUBACROMIAL DECOMPRESSION, DISTAL    UPPER GASTROINTESTINAL ENDOSCOPY      Dr Annita Sacks     Family History   Problem Relation Age of Onset    Diabetes Mother     High Blood Pressure Mother     High Blood Pressure Father     Heart Disease Father     High Blood Pressure Maternal Grandfather      Social History Socioeconomic History    Marital status:      Spouse name: None    Number of children: None    Years of education: None    Highest education level: None   Occupational History    None   Social Needs    Financial resource strain: None    Food insecurity:     Worry: None     Inability: None    Transportation needs:     Medical: None     Non-medical: None   Tobacco Use    Smoking status: Never Smoker    Smokeless tobacco: Never Used   Substance and Sexual Activity    Alcohol use: No    Drug use: No    Sexual activity: None   Lifestyle    Physical activity:     Days per week: None     Minutes per session: None    Stress: None   Relationships    Social connections:     Talks on phone: None     Gets together: None     Attends Christianity service: None     Active member of club or organization: None     Attends meetings of clubs or organizations: None     Relationship status: None    Intimate partner violence:     Fear of current or ex partner: None     Emotionally abused: None     Physically abused: None     Forced sexual activity: None   Other Topics Concern    None   Social History Narrative    None     Current Outpatient Medications   Medication Sig Dispense Refill    levothyroxine (SYNTHROID) 50 MCG tablet Take 1 tablet by mouth Daily 90 tablet 2    fluticasone (FLONASE) 50 MCG/ACT nasal spray USE 2 SPRAYS IN EACH NOSTRIL DAILY 16 g 1    levalbuterol (XOPENEX HFA) 45 MCG/ACT inhaler Inhale 2 puffs into the lungs every 4 hours as needed for Wheezing 1 Inhaler 0    omeprazole (PRILOSEC) 40 MG capsule Take 1 capsule by mouth daily Take 1 capsule twice daily, in the morning and 1 hour before dinner 60 capsule 2    EPINEPHrine (EPIPEN 2-DEVAN) 0.3 MG/0.3ML JESS injection Inject 0.3 mLs into the muscle once for 1 dose. Use as directed for allergic reaction 2 Device 1    Calcium Carbonate-Vitamin D (CALCIUM + D PO) Take  by mouth.          No current facility-administered medications for this visit. Allergies   Allergen Reactions    Latex Hives    Advil [Ibuprofen Micronized]     Aspirin     Demerol     Estrogens Conjugated      Joint pain      Hydrocodone Hives and Swelling    Macrobid [Nitrofurantoin Monohydrate Macrocrystals]     Prednisone     Vicodin [Hydrocodone-Acetaminophen]     Zithromax [Azithromycin Dihydrate]     Zyrtec [Cetirizine Hcl]     Iodine Rash       Review of Systems   Constitutional: Negative. HENT: Negative. Eyes: Negative. Respiratory: Negative. Cardiovascular: Negative. Gastrointestinal: Negative. All other systems reviewed and are negative. OBJECTIVE:  BP (!) 140/100 (Site: Left Upper Arm, Position: Sitting, Cuff Size: Small Adult)   Pulse 74   Resp 12   Wt 154 lb (69.9 kg)   LMP 01/23/2014   SpO2 98%   BMI 26.29 kg/m²     Physical Exam   Constitutional: She is oriented to person, place, and time. She appears well-developed and well-nourished. HENT:   Mouth/Throat: Oropharynx is clear and moist.   Eyes: Conjunctivae are normal. No scleral icterus. Neck: No JVD present. No thyromegaly present. Cardiovascular: Normal rate, regular rhythm, normal heart sounds and intact distal pulses. Exam reveals no gallop and no friction rub. No murmur heard. Pulmonary/Chest: Effort normal and breath sounds normal. She has no wheezes. She has no rales. Abdominal: Soft. Bowel sounds are normal. She exhibits no distension and no mass. There is no hepatosplenomegaly. There is no tenderness. No hernia. Musculoskeletal: She exhibits no edema. Lymphadenopathy:     She has no cervical adenopathy. Neurological: She is alert and oriented to person, place, and time. Skin: No rash noted. Psychiatric: She has a normal mood and affect. Vitals reviewed. ASSESSMENT:   Diagnosis Orders   1. Acquired hypothyroidism  TSH without Reflex    TSH without Reflex   2.  Essential hypertension         PLAN:    See orders, monitor blood pressure low sodium intake ambulatory recheck close follow-up if needed  Recommended patient start to have blood pressure medication now soon she wants to wait until she comes back from her trip continue to monitor

## 2019-07-30 ENCOUNTER — OFFICE VISIT (OUTPATIENT)
Dept: PRIMARY CARE CLINIC | Age: 59
End: 2019-07-30
Payer: COMMERCIAL

## 2019-07-30 VITALS
SYSTOLIC BLOOD PRESSURE: 158 MMHG | TEMPERATURE: 97.6 F | RESPIRATION RATE: 14 BRPM | WEIGHT: 159.4 LBS | BODY MASS INDEX: 27.21 KG/M2 | OXYGEN SATURATION: 100 % | HEART RATE: 68 BPM | DIASTOLIC BLOOD PRESSURE: 98 MMHG

## 2019-07-30 DIAGNOSIS — I10 ESSENTIAL HYPERTENSION: Primary | ICD-10-CM

## 2019-07-30 DIAGNOSIS — H66.005 RECURRENT ACUTE SUPPURATIVE OTITIS MEDIA WITHOUT SPONTANEOUS RUPTURE OF LEFT TYMPANIC MEMBRANE: ICD-10-CM

## 2019-07-30 PROCEDURE — 99214 OFFICE O/P EST MOD 30 MIN: CPT | Performed by: FAMILY MEDICINE

## 2019-07-30 RX ORDER — LISINOPRIL 10 MG/1
10 TABLET ORAL DAILY
Qty: 30 TABLET | Refills: 2 | Status: SHIPPED | OUTPATIENT
Start: 2019-07-30 | End: 2019-10-15 | Stop reason: SDUPTHER

## 2019-07-30 RX ORDER — AMOXICILLIN AND CLAVULANATE POTASSIUM 875; 125 MG/1; MG/1
1 TABLET, FILM COATED ORAL 2 TIMES DAILY
Qty: 20 TABLET | Refills: 0 | Status: SHIPPED | OUTPATIENT
Start: 2019-07-30 | End: 2019-09-03

## 2019-07-30 NOTE — PATIENT INSTRUCTIONS
Patient Education        DASH Diet: Care Instructions  Your Care Instructions    The DASH diet is an eating plan that can help lower your blood pressure. DASH stands for Dietary Approaches to Stop Hypertension. Hypertension is high blood pressure. The DASH diet focuses on eating foods that are high in calcium, potassium, and magnesium. These nutrients can lower blood pressure. The foods that are highest in these nutrients are fruits, vegetables, low-fat dairy products, nuts, seeds, and legumes. But taking calcium, potassium, and magnesium supplements instead of eating foods that are high in those nutrients does not have the same effect. The DASH diet also includes whole grains, fish, and poultry. The DASH diet is one of several lifestyle changes your doctor may recommend to lower your high blood pressure. Your doctor may also want you to decrease the amount of sodium in your diet. Lowering sodium while following the DASH diet can lower blood pressure even further than just the DASH diet alone. Follow-up care is a key part of your treatment and safety. Be sure to make and go to all appointments, and call your doctor if you are having problems. It's also a good idea to know your test results and keep a list of the medicines you take. How can you care for yourself at home? Following the DASH diet  · Eat 4 to 5 servings of fruit each day. A serving is 1 medium-sized piece of fruit, ½ cup chopped or canned fruit, 1/4 cup dried fruit, or 4 ounces (½ cup) of fruit juice. Choose fruit more often than fruit juice. · Eat 4 to 5 servings of vegetables each day. A serving is 1 cup of lettuce or raw leafy vegetables, ½ cup of chopped or cooked vegetables, or 4 ounces (½ cup) of vegetable juice. Choose vegetables more often than vegetable juice. · Get 2 to 3 servings of low-fat and fat-free dairy each day. A serving is 8 ounces of milk, 1 cup of yogurt, or 1 ½ ounces of cheese. · Eat 6 to 8 servings of grains each day. meals using beans and peas. Add garbanzo or kidney beans to salads. Make burritos and tacos with mashed roth beans or black beans. Where can you learn more? Go to https://avila.[employer]. org and sign in to your [employer] account. Enter T671 in the [facility] box to learn more about \"DASH Diet: Care Instructions. \"     If you do not have an account, please click on the \"Sign Up Now\" link. Current as of: July 22, 2018  Content Version: 12.0  © 5995-0382 [employer]. Care instructions adapted under license by [facility] ([facility]). If you have questions about a medical condition or this instruction, always ask your healthcare professional. [address] any warranty or liability for your use of this information. Patient Education        High Blood Pressure: Care Instructions  Overview    It's normal for blood pressure to go up and down throughout the day. But if it stays up, you have high blood pressure. Another name for high blood pressure is hypertension. Despite what a lot of people think, high blood pressure usually doesn't cause headaches or make you feel dizzy or lightheaded. It usually has no symptoms. But it does increase your risk of stroke, heart attack, and other problems. You and your doctor will talk about your risks of these problems based on your blood pressure. Your doctor will give you a goal for your blood pressure. Your goal will be based on your health and your age. Lifestyle changes, such as eating healthy and being active, are always important to help lower blood pressure. You might also take medicine to reach your blood pressure goal.  Follow-up care is a key part of your treatment and safety. Be sure to make and go to all appointments, and call your doctor if you are having problems. It's also a good idea to know your test results and keep a list of the medicines you take. How can you care for yourself at home?   Medical

## 2019-09-03 ENCOUNTER — TELEPHONE (OUTPATIENT)
Dept: PRIMARY CARE CLINIC | Age: 59
End: 2019-09-03

## 2019-09-03 DIAGNOSIS — H66.005 RECURRENT ACUTE SUPPURATIVE OTITIS MEDIA WITHOUT SPONTANEOUS RUPTURE OF LEFT TYMPANIC MEMBRANE: ICD-10-CM

## 2019-09-03 RX ORDER — AMOXICILLIN AND CLAVULANATE POTASSIUM 875; 125 MG/1; MG/1
1 TABLET, FILM COATED ORAL 2 TIMES DAILY
Qty: 28 TABLET | Refills: 0 | Status: SHIPPED | OUTPATIENT
Start: 2019-09-03 | End: 2019-09-17

## 2019-09-12 ENCOUNTER — OFFICE VISIT (OUTPATIENT)
Dept: PRIMARY CARE CLINIC | Age: 59
End: 2019-09-12
Payer: COMMERCIAL

## 2019-09-12 VITALS
HEART RATE: 72 BPM | SYSTOLIC BLOOD PRESSURE: 152 MMHG | RESPIRATION RATE: 12 BRPM | WEIGHT: 160.6 LBS | DIASTOLIC BLOOD PRESSURE: 87 MMHG | BODY MASS INDEX: 27.42 KG/M2 | OXYGEN SATURATION: 100 %

## 2019-09-12 DIAGNOSIS — I10 ESSENTIAL HYPERTENSION: ICD-10-CM

## 2019-09-12 DIAGNOSIS — H92.02 LEFT EAR PAIN: Primary | ICD-10-CM

## 2019-09-12 PROCEDURE — 99213 OFFICE O/P EST LOW 20 MIN: CPT | Performed by: FAMILY MEDICINE

## 2019-09-12 NOTE — PROGRESS NOTES
 Highest education level: None   Occupational History    None   Social Needs    Financial resource strain: None    Food insecurity:     Worry: None     Inability: None    Transportation needs:     Medical: None     Non-medical: None   Tobacco Use    Smoking status: Never Smoker    Smokeless tobacco: Never Used   Substance and Sexual Activity    Alcohol use: No    Drug use: No    Sexual activity: None   Lifestyle    Physical activity:     Days per week: None     Minutes per session: None    Stress: None   Relationships    Social connections:     Talks on phone: None     Gets together: None     Attends Church service: None     Active member of club or organization: None     Attends meetings of clubs or organizations: None     Relationship status: None    Intimate partner violence:     Fear of current or ex partner: None     Emotionally abused: None     Physically abused: None     Forced sexual activity: None   Other Topics Concern    None   Social History Narrative    None     Current Outpatient Medications   Medication Sig Dispense Refill    amoxicillin-clavulanate (AUGMENTIN) 875-125 MG per tablet Take 1 tablet by mouth 2 times daily for 14 days 28 tablet 0    levothyroxine (SYNTHROID) 50 MCG tablet Take 1 tablet by mouth Daily 90 tablet 1    levalbuterol (XOPENEX HFA) 45 MCG/ACT inhaler Inhale 2 puffs into the lungs every 4 hours as needed for Wheezing 1 Inhaler 0    fluticasone (FLONASE) 50 MCG/ACT nasal spray USE 2 SPRAYS IN EACH NOSTRIL DAILY 16 g 1    Calcium Carbonate-Vitamin D (CALCIUM + D PO) Take  by mouth.         lisinopril (PRINIVIL;ZESTRIL) 10 MG tablet Take 1 tablet by mouth daily (Patient not taking: Reported on 9/12/2019) 30 tablet 2    omeprazole (PRILOSEC) 40 MG capsule Take 1 capsule by mouth daily Take 1 capsule twice daily, in the morning and 1 hour before dinner 60 capsule 2    EPINEPHrine (EPIPEN 2-DEVAN) 0.3 MG/0.3ML JESS injection Inject 0.3 mLs into the muscle once for 1 dose. Use as directed for allergic reaction 2 Device 1     No current facility-administered medications for this visit. Allergies   Allergen Reactions    Latex Hives    Advil [Ibuprofen Micronized]     Aspirin     Demerol     Estrogens Conjugated      Joint pain      Hydrocodone Hives and Swelling    Macrobid [Nitrofurantoin Monohydrate Macrocrystals]     Prednisone     Vicodin [Hydrocodone-Acetaminophen]     Zithromax [Azithromycin Dihydrate]     Zyrtec [Cetirizine Hcl]     Iodine Rash       Review of Systems   Constitutional: Negative. HENT: Positive for ear pain. Negative for congestion, dental problem, drooling, facial swelling, hearing loss, mouth sores and postnasal drip. Eyes: Negative. Respiratory: Negative. Cardiovascular: Negative. Gastrointestinal: Negative. Endocrine: Negative. Genitourinary: Negative. Musculoskeletal: Negative. All other systems reviewed and are negative. OBJECTIVE:  BP (!) 152/87 (Site: Left Upper Arm, Position: Sitting, Cuff Size: Small Adult)   Pulse 72   Resp 12   Wt 160 lb 9.6 oz (72.8 kg)   LMP 01/23/2014   SpO2 100%   Breastfeeding? No   BMI 27.42 kg/m²     Physical Exam   Constitutional: She is oriented to person, place, and time. She appears well-developed and well-nourished. HENT:   Head: Normocephalic and atraumatic. Right Ear: External ear normal.   Left Ear: External ear normal.   Mouth/Throat: Oropharynx is clear and moist.   Eyes: Conjunctivae are normal. No scleral icterus. Neck: No JVD present. No thyromegaly present. Cardiovascular: Normal rate, regular rhythm, normal heart sounds and intact distal pulses. Exam reveals no gallop and no friction rub. No murmur heard. Pulmonary/Chest: Effort normal and breath sounds normal. She has no wheezes. She has no rales. Abdominal: Soft. Bowel sounds are normal. She exhibits no distension and no mass. There is no hepatosplenomegaly. There is no tenderness.

## 2019-09-13 ASSESSMENT — ENCOUNTER SYMPTOMS
EYES NEGATIVE: 1
RESPIRATORY NEGATIVE: 1
GASTROINTESTINAL NEGATIVE: 1
FACIAL SWELLING: 0

## 2019-09-27 DIAGNOSIS — J30.9 ALLERGIC RHINITIS, UNSPECIFIED SEASONALITY, UNSPECIFIED TRIGGER: ICD-10-CM

## 2019-09-27 RX ORDER — FLUTICASONE PROPIONATE 50 MCG
SPRAY, SUSPENSION (ML) NASAL
Qty: 1 BOTTLE | Refills: 0 | Status: SHIPPED | OUTPATIENT
Start: 2019-09-27 | End: 2019-12-16

## 2019-09-27 NOTE — TELEPHONE ENCOUNTER
Medication:   Requested Prescriptions     Pending Prescriptions Disp Refills    fluticasone (FLONASE) 50 MCG/ACT nasal spray [Pharmacy Med Name: FLUTICASONE PROP 50 MCG SPRAY]  0     Sig: SPRAY TWO SPRAYS IN EACH NOSTRIL ONCE DAILY     Last Filled:  2/23/18    Last appt: 9/12/2019   Next appt: 10/15/2019

## 2019-10-03 ENCOUNTER — NURSE ONLY (OUTPATIENT)
Dept: PRIMARY CARE CLINIC | Age: 59
End: 2019-10-03
Payer: COMMERCIAL

## 2019-10-03 DIAGNOSIS — Z23 NEED FOR INFLUENZA VACCINATION: Primary | ICD-10-CM

## 2019-10-03 PROCEDURE — 90471 IMMUNIZATION ADMIN: CPT | Performed by: FAMILY MEDICINE

## 2019-10-03 PROCEDURE — 90686 IIV4 VACC NO PRSV 0.5 ML IM: CPT | Performed by: FAMILY MEDICINE

## 2019-10-15 ENCOUNTER — OFFICE VISIT (OUTPATIENT)
Dept: PRIMARY CARE CLINIC | Age: 59
End: 2019-10-15
Payer: COMMERCIAL

## 2019-10-15 VITALS
BODY MASS INDEX: 27.14 KG/M2 | DIASTOLIC BLOOD PRESSURE: 80 MMHG | OXYGEN SATURATION: 99 % | HEIGHT: 64 IN | HEART RATE: 72 BPM | SYSTOLIC BLOOD PRESSURE: 122 MMHG | WEIGHT: 159 LBS

## 2019-10-15 DIAGNOSIS — I10 ESSENTIAL HYPERTENSION: ICD-10-CM

## 2019-10-15 PROCEDURE — 99213 OFFICE O/P EST LOW 20 MIN: CPT | Performed by: FAMILY MEDICINE

## 2019-10-15 RX ORDER — LISINOPRIL 10 MG/1
10 TABLET ORAL DAILY
Qty: 30 TABLET | Refills: 5 | Status: SHIPPED | OUTPATIENT
Start: 2019-10-15 | End: 2019-12-05 | Stop reason: SDUPTHER

## 2019-10-15 ASSESSMENT — PATIENT HEALTH QUESTIONNAIRE - PHQ9
1. LITTLE INTEREST OR PLEASURE IN DOING THINGS: 0
SUM OF ALL RESPONSES TO PHQ QUESTIONS 1-9: 0
SUM OF ALL RESPONSES TO PHQ9 QUESTIONS 1 & 2: 0
2. FEELING DOWN, DEPRESSED OR HOPELESS: 0
SUM OF ALL RESPONSES TO PHQ QUESTIONS 1-9: 0

## 2019-10-28 ENCOUNTER — TELEPHONE (OUTPATIENT)
Dept: PRIMARY CARE CLINIC | Age: 59
End: 2019-10-28

## 2019-11-11 ENCOUNTER — TELEPHONE (OUTPATIENT)
Dept: PRIMARY CARE CLINIC | Age: 59
End: 2019-11-11

## 2019-12-05 DIAGNOSIS — I10 ESSENTIAL HYPERTENSION: ICD-10-CM

## 2019-12-05 RX ORDER — LISINOPRIL 10 MG/1
10 TABLET ORAL DAILY
Qty: 90 TABLET | Refills: 1 | Status: SHIPPED | OUTPATIENT
Start: 2019-12-05 | End: 2020-04-21 | Stop reason: SDUPTHER

## 2019-12-12 RX ORDER — LEVOTHYROXINE SODIUM 0.05 MG/1
50 TABLET ORAL DAILY
Qty: 90 TABLET | Refills: 1 | Status: SHIPPED | OUTPATIENT
Start: 2019-12-12 | End: 2020-04-21 | Stop reason: SDUPTHER

## 2019-12-16 ENCOUNTER — OFFICE VISIT (OUTPATIENT)
Dept: PRIMARY CARE CLINIC | Age: 59
End: 2019-12-16
Payer: COMMERCIAL

## 2019-12-16 VITALS
OXYGEN SATURATION: 98 % | TEMPERATURE: 99.2 F | BODY MASS INDEX: 27.68 KG/M2 | RESPIRATION RATE: 12 BRPM | DIASTOLIC BLOOD PRESSURE: 91 MMHG | HEART RATE: 69 BPM | WEIGHT: 160 LBS | SYSTOLIC BLOOD PRESSURE: 155 MMHG

## 2019-12-16 DIAGNOSIS — J01.00 ACUTE NON-RECURRENT MAXILLARY SINUSITIS: Primary | ICD-10-CM

## 2019-12-16 PROCEDURE — 99214 OFFICE O/P EST MOD 30 MIN: CPT | Performed by: FAMILY MEDICINE

## 2019-12-16 RX ORDER — BENZONATATE 100 MG/1
100 CAPSULE ORAL 2 TIMES DAILY PRN
Qty: 20 CAPSULE | Refills: 0 | Status: SHIPPED | OUTPATIENT
Start: 2019-12-16 | End: 2019-12-23

## 2019-12-16 RX ORDER — AMOXICILLIN AND CLAVULANATE POTASSIUM 875; 125 MG/1; MG/1
1 TABLET, FILM COATED ORAL 2 TIMES DAILY
Qty: 20 TABLET | Refills: 0 | Status: SHIPPED | OUTPATIENT
Start: 2019-12-16 | End: 2019-12-26

## 2019-12-27 DIAGNOSIS — I10 ESSENTIAL HYPERTENSION: ICD-10-CM

## 2019-12-27 LAB
ALBUMIN SERPL-MCNC: 4.2 G/DL (ref 3.4–5)
ALP BLD-CCNC: 88 U/L (ref 40–129)
ALT SERPL-CCNC: 14 U/L (ref 10–40)
ANION GAP SERPL CALCULATED.3IONS-SCNC: 11 MMOL/L (ref 3–16)
AST SERPL-CCNC: 20 U/L (ref 15–37)
BILIRUB SERPL-MCNC: <0.2 MG/DL (ref 0–1)
BILIRUBIN DIRECT: <0.2 MG/DL (ref 0–0.3)
BILIRUBIN, INDIRECT: NORMAL MG/DL (ref 0–1)
BUN BLDV-MCNC: 13 MG/DL (ref 7–20)
CALCIUM SERPL-MCNC: 9.7 MG/DL (ref 8.3–10.6)
CHLORIDE BLD-SCNC: 102 MMOL/L (ref 99–110)
CHOLESTEROL, TOTAL: 232 MG/DL (ref 0–199)
CO2: 26 MMOL/L (ref 21–32)
CREAT SERPL-MCNC: 0.9 MG/DL (ref 0.6–1.1)
GFR AFRICAN AMERICAN: >60
GFR NON-AFRICAN AMERICAN: >60
GLUCOSE BLD-MCNC: 105 MG/DL (ref 70–99)
HCT VFR BLD CALC: 38.4 % (ref 36–48)
HDLC SERPL-MCNC: 59 MG/DL (ref 40–60)
HEMOGLOBIN: 12.2 G/DL (ref 12–16)
LDL CHOLESTEROL CALCULATED: 119 MG/DL
MCH RBC QN AUTO: 26.2 PG (ref 26–34)
MCHC RBC AUTO-ENTMCNC: 31.9 G/DL (ref 31–36)
MCV RBC AUTO: 82.3 FL (ref 80–100)
PDW BLD-RTO: 13.4 % (ref 12.4–15.4)
PLATELET # BLD: 192 K/UL (ref 135–450)
PMV BLD AUTO: 10.6 FL (ref 5–10.5)
POTASSIUM SERPL-SCNC: 5 MMOL/L (ref 3.5–5.1)
RBC # BLD: 4.67 M/UL (ref 4–5.2)
SODIUM BLD-SCNC: 139 MMOL/L (ref 136–145)
TOTAL PROTEIN: 7.3 G/DL (ref 6.4–8.2)
TRIGL SERPL-MCNC: 268 MG/DL (ref 0–150)
TSH SERPL DL<=0.05 MIU/L-ACNC: 1.61 UIU/ML (ref 0.27–4.2)
VLDLC SERPL CALC-MCNC: 54 MG/DL
WBC # BLD: 5.7 K/UL (ref 4–11)

## 2019-12-27 RX ORDER — LEVALBUTEROL TARTRATE 45 UG/1
2 AEROSOL, METERED ORAL EVERY 4 HOURS PRN
Qty: 1 INHALER | Refills: 0 | Status: SHIPPED | OUTPATIENT
Start: 2019-12-27 | End: 2020-03-24 | Stop reason: SDUPTHER

## 2019-12-27 RX ORDER — FLUTICASONE PROPIONATE 50 MCG
SPRAY, SUSPENSION (ML) NASAL
Qty: 16 G | Refills: 1 | Status: SHIPPED | OUTPATIENT
Start: 2019-12-27 | End: 2020-04-21 | Stop reason: SDUPTHER

## 2020-01-16 ENCOUNTER — OFFICE VISIT (OUTPATIENT)
Dept: PRIMARY CARE CLINIC | Age: 60
End: 2020-01-16
Payer: COMMERCIAL

## 2020-01-16 VITALS
SYSTOLIC BLOOD PRESSURE: 138 MMHG | RESPIRATION RATE: 21 BRPM | BODY MASS INDEX: 27.68 KG/M2 | OXYGEN SATURATION: 98 % | WEIGHT: 160 LBS | HEART RATE: 74 BPM | DIASTOLIC BLOOD PRESSURE: 86 MMHG

## 2020-01-16 PROCEDURE — 99213 OFFICE O/P EST LOW 20 MIN: CPT | Performed by: FAMILY MEDICINE

## 2020-01-16 ASSESSMENT — PATIENT HEALTH QUESTIONNAIRE - PHQ9
SUM OF ALL RESPONSES TO PHQ QUESTIONS 1-9: 0
SUM OF ALL RESPONSES TO PHQ QUESTIONS 1-9: 0
1. LITTLE INTEREST OR PLEASURE IN DOING THINGS: 0
SUM OF ALL RESPONSES TO PHQ9 QUESTIONS 1 & 2: 0
2. FEELING DOWN, DEPRESSED OR HOPELESS: 0

## 2020-01-16 NOTE — PROGRESS NOTES
ARTHROSCOPE WITH PARTIAL MEDIAL MENISCECTOMY, SYNOVECTOMY AND CHONDROPLASTY                                SHOULDER ARTHROSCOPY  9-4-2012    left shoulder arthroscopy with subacromial decompression , distal clavicle excision ,synovectomy,labral debridement    SHOULDER ARTHROSCOPY  10/16/2012    RIGHT SHOULDER ARTHROSCOPE, SUBACROMIAL DECOMPRESSION, DISTAL    UPPER GASTROINTESTINAL ENDOSCOPY  2007    Dr Vickie Saleem     Family History   Problem Relation Age of Onset    Diabetes Mother     High Blood Pressure Mother     High Blood Pressure Father     Heart Disease Father     High Blood Pressure Maternal Grandfather      Social History     Socioeconomic History    Marital status:      Spouse name: None    Number of children: None    Years of education: None    Highest education level: None   Occupational History    None   Social Needs    Financial resource strain: None    Food insecurity:     Worry: None     Inability: None    Transportation needs:     Medical: None     Non-medical: None   Tobacco Use    Smoking status: Never Smoker    Smokeless tobacco: Never Used   Substance and Sexual Activity    Alcohol use: No    Drug use: No    Sexual activity: None   Lifestyle    Physical activity:     Days per week: None     Minutes per session: None    Stress: None   Relationships    Social connections:     Talks on phone: None     Gets together: None     Attends Latter day service: None     Active member of club or organization: None     Attends meetings of clubs or organizations: None     Relationship status: None    Intimate partner violence:     Fear of current or ex partner: None     Emotionally abused: None     Physically abused: None     Forced sexual activity: None   Other Topics Concern    None   Social History Narrative    None     Current Outpatient Medications   Medication Sig Dispense Refill    levalbuterol (XOPENEX HFA) 45 MCG/ACT inhaler Inhale 2 puffs into the lungs every 4 hours as mass.      Tenderness: There is no tenderness. Hernia: No hernia is present. Lymphadenopathy:      Cervical: No cervical adenopathy. Skin:     Findings: No rash. Neurological:      Mental Status: She is alert and oriented to person, place, and time. ASSESSMENT:   Diagnosis Orders   1. Essential hypertension     2. Anxiety  Ambulatory referral to Psychology         PLAN:    See orders   Continue the same  Ready to be any medication for anxiety she is willing to start counseling.

## 2020-03-24 RX ORDER — LEVALBUTEROL TARTRATE 45 UG/1
2 AEROSOL, METERED ORAL EVERY 4 HOURS PRN
Qty: 1 INHALER | Refills: 1 | Status: SHIPPED | OUTPATIENT
Start: 2020-03-24 | End: 2021-04-20 | Stop reason: SDUPTHER

## 2020-03-24 NOTE — TELEPHONE ENCOUNTER
Pt needs a refill for:    levalbuterol Morelia Ko HFA) 45 MCG/ACT inhaler [[id number]    JERAD RUBIO [address], [address]    Pt is asking how many refills she can get with the prescription.     LOV 10.15.19

## 2020-03-24 NOTE — TELEPHONE ENCOUNTER
Medication:   Requested Prescriptions     Pending Prescriptions Disp Refills    levalbuterol (XOPENEX HFA) 45 MCG/ACT inhaler 1 Inhaler 0     Sig: Inhale 2 puffs into the lungs every 4 hours as needed for Wheezing     Last Filled:  12/27/19    Last appt: 1/16/2020   Next appt: 4/21/2020

## 2020-04-21 ENCOUNTER — VIRTUAL VISIT (OUTPATIENT)
Dept: PRIMARY CARE CLINIC | Age: 60
End: 2020-04-21
Payer: COMMERCIAL

## 2020-04-21 PROCEDURE — 99213 OFFICE O/P EST LOW 20 MIN: CPT | Performed by: FAMILY MEDICINE

## 2020-04-21 RX ORDER — LEVOTHYROXINE SODIUM 0.05 MG/1
50 TABLET ORAL DAILY
Qty: 90 TABLET | Refills: 0 | Status: SHIPPED | OUTPATIENT
Start: 2020-04-21 | End: 2020-10-19

## 2020-04-21 RX ORDER — FLUTICASONE PROPIONATE 50 MCG
SPRAY, SUSPENSION (ML) NASAL
Qty: 16 G | Refills: 1 | Status: SHIPPED
Start: 2020-04-21 | End: 2020-04-21 | Stop reason: SINTOL

## 2020-04-21 RX ORDER — LISINOPRIL 10 MG/1
10 TABLET ORAL DAILY
Qty: 90 TABLET | Refills: 0 | Status: SHIPPED | OUTPATIENT
Start: 2020-04-21 | End: 2020-07-07 | Stop reason: SDUPTHER

## 2020-04-21 RX ORDER — AZELASTINE 1 MG/ML
2 SPRAY, METERED NASAL 2 TIMES DAILY
Qty: 4 BOTTLE | Refills: 1 | Status: SHIPPED | OUTPATIENT
Start: 2020-04-21 | End: 2021-03-15

## 2020-04-21 SDOH — ECONOMIC STABILITY: TRANSPORTATION INSECURITY
IN THE PAST 12 MONTHS, HAS THE LACK OF TRANSPORTATION KEPT YOU FROM MEDICAL APPOINTMENTS OR FROM GETTING MEDICATIONS?: NO

## 2020-04-21 SDOH — ECONOMIC STABILITY: FOOD INSECURITY: WITHIN THE PAST 12 MONTHS, YOU WORRIED THAT YOUR FOOD WOULD RUN OUT BEFORE YOU GOT MONEY TO BUY MORE.: NEVER TRUE

## 2020-04-21 SDOH — ECONOMIC STABILITY: INCOME INSECURITY: HOW HARD IS IT FOR YOU TO PAY FOR THE VERY BASICS LIKE FOOD, HOUSING, MEDICAL CARE, AND HEATING?: NOT HARD AT ALL

## 2020-04-21 SDOH — ECONOMIC STABILITY: TRANSPORTATION INSECURITY
IN THE PAST 12 MONTHS, HAS LACK OF TRANSPORTATION KEPT YOU FROM MEETINGS, WORK, OR FROM GETTING THINGS NEEDED FOR DAILY LIVING?: NO

## 2020-04-21 ASSESSMENT — ENCOUNTER SYMPTOMS
GASTROINTESTINAL NEGATIVE: 1
EYES NEGATIVE: 1
RESPIRATORY NEGATIVE: 1

## 2020-04-21 NOTE — PROGRESS NOTES
2020    TELEHEALTH EVALUATION -- Audio/Visual (During SKBIJ-61 public health emergency)    HPI:    Marco A Hill (:  1960) has requested an audio/video evaluation for the following concern(s):    Hypertension: Patient here for follow-up of elevated blood pressure. She is exercising and is adherent to low salt diet. Blood pressure is well controlled at home. Cardiac symptoms none. Patient denies chest pain, chest pressure/discomfort, dyspnea, exertional chest pressure/discomfort, fatigue, lower extremity edema, near-syncope, palpitations and syncope. Cardiovascular risk factors: hypertension. Use of agents associated with hypertension: none. History of target organ damage: none. Pt with allergic rhinitis doing ok on med's  She has noticed increase hallucination after she started Flonase  1 hypothyroidism medication seems okay until lately she has been feeling colder than usual she has to use 2 layers instead of 1 compared to the rest of her family she is not interested do blood work soon just because she does not be in place where she can contact anybody who is sick  Review of Systems   HENT: Negative. Eyes: Negative. Respiratory: Negative. Cardiovascular: Negative. Gastrointestinal: Negative. Endocrine: Positive for cold intolerance. Genitourinary: Negative. Musculoskeletal: Negative. All other systems reviewed and are negative. Prior to Visit Medications    Medication Sig Taking?  Authorizing Provider   levalbuterol [facility]) 45 MCG/ACT inhaler Inhale 2 puffs into the lungs every 4 hours as needed for Wheezing Yes Abdirizak Avelar MD   fluticasone (FLONASE) 50 MCG/ACT nasal spray USE 2 SPRAYS IN EACH NOSTRIL DAILY Yes Ines Brunner MD   levothyroxine (SYNTHROID) 50 MCG tablet Take 1 tablet by mouth Daily Yes Abdirizak Avelar MD   lisinopril (PRINIVIL;ZESTRIL) 10 MG tablet Take 1 tablet by mouth daily Yes Abdirizak Avelar MD   omeprazole (PRILOSEC) 40 MG capsule Take 1 capsule by mouth daily Take 1 capsule twice daily, in the morning and 1 hour before dinner  King Bi MD   EPINEPHrine (EPIPEN 2-DEVAN) 0.3 MG/0.3ML JESS injection Inject 0.3 mLs into the muscle once for 1 dose. Use as directed for allergic reaction  Priscilla Villarreal III, MD       Social History     Tobacco Use    Smoking status: Never Smoker    Smokeless tobacco: Never Used   Substance Use Topics    Alcohol use: No    Drug use: No            PHYSICAL EXAMINATION:  [ INSTRUCTIONS:  \"[x]\" Indicates a positive item  \"[]\" Indicates a negative item  -- DELETE ALL ITEMS NOT EXAMINED]  Vital Signs: (As obtained by patient/caregiver or practitioner observation)    Blood pressure-  Heart rate-    Respiratory rate-    Temperature-  Pulse oximetry-     Constitutional: [x] Appears well-developed and well-nourished [x] No apparent distress      [] Abnormal-   Mental status  [x] Alert and awake  [x] Oriented to person/place/time []Able to follow commands      Eyes:  EOM    [x]  Normal  [] Abnormal-  Sclera  [x]  Normal  [] Abnormal -         Discharge [x]  None visible  [] Abnormal -    HENT:   [x] Normocephalic, atraumatic.   [] Abnormal   [x] Mouth/Throat: Mucous membranes are moist.     External Ears [x] Normal  [] Abnormal-     Neck: [x] No visualized mass     Pulmonary/Chest: [x] Respiratory effort normal.  [] No visualized signs of difficulty breathing or respiratory distress        [] Abnormal-      Musculoskeletal:   [] Normal gait with no signs of ataxia         [] Normal range of motion of neck        [] Abnormal-       Neurological:        [x] No Facial Asymmetry (Cranial nerve 7 motor function) (limited exam to video visit)          [] No gaze palsy        [] Abnormal-         Skin:        [x] No significant exanthematous lesions or discoloration noted on facial skin         [] Abnormal-            Psychiatric:       [x] Normal Affect [] No Hallucinations        [] Abnormal-     Other pertinent observable physical

## 2020-07-07 ENCOUNTER — OFFICE VISIT (OUTPATIENT)
Dept: PRIMARY CARE CLINIC | Age: 60
End: 2020-07-07
Payer: COMMERCIAL

## 2020-07-07 VITALS
WEIGHT: 157.8 LBS | BODY MASS INDEX: 27.3 KG/M2 | HEART RATE: 96 BPM | SYSTOLIC BLOOD PRESSURE: 157 MMHG | RESPIRATION RATE: 12 BRPM | OXYGEN SATURATION: 100 % | DIASTOLIC BLOOD PRESSURE: 88 MMHG | TEMPERATURE: 98.2 F

## 2020-07-07 LAB
BILIRUBIN, POC: ABNORMAL
BLOOD URINE, POC: ABNORMAL
CLARITY, POC: CLEAR
COLOR, POC: YELLOW
GLUCOSE URINE, POC: ABNORMAL
KETONES, POC: ABNORMAL
LEUKOCYTE EST, POC: ABNORMAL
NITRITE, POC: ABNORMAL
PH, POC: 5.5
PROTEIN, POC: ABNORMAL
SPECIFIC GRAVITY, POC: 1
UROBILINOGEN, POC: 0.2

## 2020-07-07 PROCEDURE — 99214 OFFICE O/P EST MOD 30 MIN: CPT | Performed by: FAMILY MEDICINE

## 2020-07-07 PROCEDURE — 81002 URINALYSIS NONAUTO W/O SCOPE: CPT | Performed by: FAMILY MEDICINE

## 2020-07-07 RX ORDER — OMEPRAZOLE 40 MG/1
40 CAPSULE, DELAYED RELEASE ORAL DAILY
Qty: 30 CAPSULE | Refills: 3 | Status: SHIPPED | OUTPATIENT
Start: 2020-07-07 | End: 2021-09-09

## 2020-07-07 RX ORDER — LISINOPRIL 10 MG/1
10 TABLET ORAL 2 TIMES DAILY
Qty: 180 TABLET | Refills: 0 | Status: SHIPPED | OUTPATIENT
Start: 2020-07-07 | End: 2020-09-11

## 2020-07-07 ASSESSMENT — ENCOUNTER SYMPTOMS
NAUSEA: 0
EYES NEGATIVE: 1
DIARRHEA: 0
RESPIRATORY NEGATIVE: 1
RECTAL PAIN: 0
CONSTIPATION: 0
ABDOMINAL PAIN: 1
BLOOD IN STOOL: 0
VOMITING: 0
ANAL BLEEDING: 0
ABDOMINAL DISTENTION: 0

## 2020-07-07 NOTE — PROGRESS NOTES
SUBJECTIVE:  Patient ID: Osmar Butt is a 64 y.o. y.o. female     HPI   Abdominal Pain: Patient complains of abdominal pain. The pain is described as aching and cramping, and is 4/10 in intensity. Pain is located in the epigastric without radiation. Onset was a few weeks ago. Symptoms have been unchanged since. Aggravating factors: none. Alleviating factors: none. Associated symptoms: none. The patient denies anorexia, arthralgias, belching, constipation, fever, frequency, hematochezia, melena, myalgias, nausea, sweats and vomiting. Carpal Tunnel Syndrome: She complains of bilateral hand numbness, tingling. This is evaluated as a personal injury. The onset of symptoms was gradual, starting about several months ago. The pain is described as aching, numbing and tingling. Symptoms occur every evening and last 3 hours. She does not have had night time symptoms. Restricted activities include: none, repetitive use pattern. She does not have had Physical Therapy for these symptoms. The pain is relieved by rest. Her work is repetitious and she has not missed work. Hypertension: Patient here for follow-up of elevated blood pressure. She is exercising and is adherent to low salt diet. Blood pressure is well controlled at home. Cardiac symptoms none. Patient denies chest pain, chest pressure/discomfort, dyspnea, exertional chest pressure/discomfort, fatigue, lower extremity edema, near-syncope, palpitations and syncope. Cardiovascular risk factors: hypertension. Use of agents associated with hypertension: none. History of target organ damage: none.         Past Medical History:   Diagnosis Date    Asthma     Capsulitis of shoulder Feb 2012    left shoulder, Dr Antonio Campoverde Hypothyroid     IBS (irritable bowel syndrome) 2007    Nausea & vomiting     Raynaud's disease 2002    to feet      Past Surgical History:   Procedure Laterality Date   [facility]    COLONOSCOPY  2007    Dr Tamela Bello ARTHROSCOPY Left 2/4/2014    LEFT KNEE ARTHROSCOPE WITH PARTIAL MEDIAL MENISCECTOMY, SYNOVECTOMY AND CHONDROPLASTY                                SHOULDER ARTHROSCOPY  9-4-2012    left shoulder arthroscopy with subacromial decompression , distal clavicle excision ,synovectomy,labral debridement    SHOULDER ARTHROSCOPY  10/16/2012    RIGHT SHOULDER ARTHROSCOPE, SUBACROMIAL DECOMPRESSION, DISTAL    UPPER GASTROINTESTINAL ENDOSCOPY  2007    Dr Ignacio Dozier     Family History   Problem Relation Age of Onset    Diabetes Mother     High Blood Pressure Mother     High Blood Pressure Father     Heart Disease Father     High Blood Pressure Maternal Grandfather      Social History     Socioeconomic History    Marital status:      Spouse name: Not on file    Number of children: Not on file    Years of education: Not on file    Highest education level: Not on file   Occupational History    Not on file   Social Needs    Financial resource strain: Not hard at all   [place]il[place] insecurity     Worry: Never true     Inability: Not on file    Transportation needs     Medical: No     Non-medical: No   Tobacco Use    Smoking status: Never Smoker    Smokeless tobacco: Never Used   Substance and Sexual Activity    Alcohol use: No    Drug use: No    Sexual activity: Not on file   Lifestyle    Physical activity     Days per week: Not on file     Minutes per session: Not on file    Stress: Not on file   Relationships    Social connections     Talks on phone: Not on file     Gets together: Not on file     Attends Taoism service: Not on file     Active member of club or organization: Not on file     Attends meetings of clubs or organizations: Not on file     Relationship status: Not on file    Intimate partner violence     Fear of current or ex partner: Not on file     Emotionally abused: Not on file     Physically abused: Not on file     Forced sexual activity: Not on file   Other Topics Concern    Not on file   Social Conjunctiva/sclera: Conjunctivae normal.   Neck:      Thyroid: No thyromegaly. Vascular: No JVD. Cardiovascular:      Rate and Rhythm: Normal rate and regular rhythm. Heart sounds: Normal heart sounds. No murmur. No friction rub. No gallop. Pulmonary:      Effort: Pulmonary effort is normal.      Breath sounds: Normal breath sounds. No wheezing or rales. Abdominal:      General: Bowel sounds are normal. There is no distension. Palpations: Abdomen is soft. There is no mass. Tenderness: There is abdominal tenderness in the epigastric area. Hernia: No hernia is present. Lymphadenopathy:      Cervical: No cervical adenopathy. Skin:     Findings: No rash. Neurological:      Mental Status: She is alert and oriented to person, place, and time. Cranial Nerves: No cranial nerve deficit. Motor: No weakness. Coordination: Coordination normal.      Gait: Gait normal.         ASSESSMENT:     Diagnosis Orders   1. Abdominal pain, unspecified abdominal location  POCT Urinalysis no Micro    Culture, Urine   2. Essential hypertension  lisinopril (PRINIVIL;ZESTRIL) 10 MG tablet   3.  Bilateral carpal tunnel syndrome  EMG       PLAN:  See orders  Trial of PPI  Close follow-up monitor symptoms closely  Increase lisinopril twice daily  Do EMG recommend doing brace at night

## 2020-07-08 ENCOUNTER — TELEPHONE (OUTPATIENT)
Dept: PRIMARY CARE CLINIC | Age: 60
End: 2020-07-08

## 2020-07-08 NOTE — TELEPHONE ENCOUNTER
Pt can't remember who Dr. Pamela Samson suggested for the EMG for carpal tunnel? She needs a phone number.     LOV 7/7/20

## 2020-07-10 LAB
ORGANISM: ABNORMAL
URINE CULTURE, ROUTINE: ABNORMAL

## 2020-07-10 RX ORDER — CIPROFLOXACIN 500 MG/1
500 TABLET, FILM COATED ORAL 2 TIMES DAILY
Qty: 10 TABLET | Refills: 0 | Status: SHIPPED | OUTPATIENT
Start: 2020-07-10 | End: 2020-07-15

## 2020-09-11 RX ORDER — LISINOPRIL 10 MG/1
TABLET ORAL
Qty: 30 TABLET | Refills: 0 | Status: SHIPPED | OUTPATIENT
Start: 2020-09-11 | End: 2021-03-08

## 2020-09-11 NOTE — TELEPHONE ENCOUNTER
Sent [employer] message to contact office to schedule an appointment    Medication:   Requested Prescriptions     Pending Prescriptions Disp Refills    lisinopril (PRINIVIL;ZESTRIL) 10 MG tablet [Pharmacy Med Name: LISINOPRIL 10 MG TABLET] 90 tablet 4     Sig: TAKE ONE TABLET BY MOUTH DAILY     Last Filled:  7/7/20    Last appt: 7/7/2020   Next appt: Visit date not found    Last Labs DM: No results found for: LABA1C  Last Lipid:   Lab Results   Component Value Date    CHOL 232 12/27/2019    TRIG 268 12/27/2019    HDL 59 12/27/2019    LDLCALC 119 12/27/2019     Last PSA: No results found for: PSA  Last Thyroid:   Lab Results   Component Value Date    TSH 1.61 12/27/2019    FT3 2.7 05/18/2015    P8ZQXZA 9.8 05/18/2015

## 2020-09-14 RX ORDER — LISINOPRIL 10 MG/1
TABLET ORAL
Qty: 90 TABLET | Refills: 4 | OUTPATIENT
Start: 2020-09-14

## 2020-09-14 NOTE — TELEPHONE ENCOUNTER
Already sent in 9/11/20  #30 x 0    Medication:   Requested Prescriptions     Pending Prescriptions Disp Refills    lisinopril (PRINIVIL;ZESTRIL) 10 MG tablet [Pharmacy Med Name: LISINOPRIL 10 MG TABLET] 90 tablet 4     Sig: TAKE ONE TABLET BY MOUTH DAILY       Last Filled:  9/11/20    Patient Phone Number: [phone] (home)     Last appt: 7/7/2020   Next appt: Visit date not found    Last BMP:   Lab Results   Component Value Date     12/27/2019    K 5.0 12/27/2019     12/27/2019    CO2 26 12/27/2019    ANIONGAP 11 12/27/2019    GLUCOSE 105 12/27/2019    BUN 13 12/27/2019    CREATININE 0.9 12/27/2019    LABGLOM >60 12/27/2019    GFRAA >60 12/27/2019    GFRAA >60 08/23/2012    CALCIUM 9.7 12/27/2019      Last CMP:   Lab Results   Component Value Date     12/27/2019    K 5.0 12/27/2019     12/27/2019    CO2 26 12/27/2019    ANIONGAP 11 12/27/2019    GLUCOSE 105 12/27/2019    BUN 13 12/27/2019    CREATININE 0.9 12/27/2019    LABGLOM >60 12/27/2019    GFRAA >60 12/27/2019    GFRAA >60 08/23/2012    PROT 7.3 12/27/2019    LABALBU 4.2 12/27/2019    BILITOT <0.2 12/27/2019    ALKPHOS 88 12/27/2019    ALT 14 12/27/2019    AST 20 12/27/2019     Last Renal Function:   Lab Results   Component Value Date     12/27/2019    K 5.0 12/27/2019     12/27/2019    CO2 26 12/27/2019    GLUCOSE 105 12/27/2019    BUN 13 12/27/2019    CREATININE 0.9 12/27/2019    LABALBU 4.2 12/27/2019    CALCIUM 9.7 12/27/2019    GFR >60 08/23/2012    GFRAA >60 12/27/2019    GFRAA >60 08/23/2012       Last OARRS: No flowsheet data found. Preferred Pharmacy:   [facility] [address] [place] [address]  Phone: [phone] Fax: [phone]    [facility] [address] [facility] [phone]  [facility]  [address] [place] [address]  Phone: [phone] Fax: [phone]

## 2020-10-19 RX ORDER — LEVOTHYROXINE SODIUM 0.05 MG/1
TABLET ORAL
Qty: 90 TABLET | Refills: 0 | Status: SHIPPED | OUTPATIENT
Start: 2020-10-19 | End: 2021-01-11

## 2020-12-08 ENCOUNTER — OFFICE VISIT (OUTPATIENT)
Dept: PRIMARY CARE CLINIC | Age: 60
End: 2020-12-08
Payer: COMMERCIAL

## 2020-12-08 VITALS
DIASTOLIC BLOOD PRESSURE: 80 MMHG | RESPIRATION RATE: 12 BRPM | TEMPERATURE: 97.1 F | WEIGHT: 158 LBS | SYSTOLIC BLOOD PRESSURE: 142 MMHG | HEART RATE: 87 BPM | BODY MASS INDEX: 27.33 KG/M2 | OXYGEN SATURATION: 98 %

## 2020-12-08 PROCEDURE — 99213 OFFICE O/P EST LOW 20 MIN: CPT | Performed by: FAMILY MEDICINE

## 2020-12-08 ASSESSMENT — ENCOUNTER SYMPTOMS
SORE THROAT: 0
TROUBLE SWALLOWING: 0
GASTROINTESTINAL NEGATIVE: 1
RHINORRHEA: 0
EYES NEGATIVE: 1
RESPIRATORY NEGATIVE: 1
VOICE CHANGE: 0
SINUS PAIN: 0
SINUS PRESSURE: 0

## 2020-12-08 NOTE — PROGRESS NOTES
SUBJECTIVE:  Patient ID: Glo Benson is a 64 y.o. y.o. female     HPI   Pt with complain of left ear pain and draingae on and off, for the last 3 weeks  Lost her hearing on that side, she has been having symptoms on and off with her ears denies any other sore throat or cough or fever or chills or any other symptoms.   With hypertension she could not tolerate the lisinopril 10 mg he can very sleepy she wants to try to get it down her blood pressure was up a little bit better her blood pressure at home is good she is nervous today about coming to the office  Past Medical History:   Diagnosis Date    Asthma     Capsulitis of shoulder Feb 2012    left shoulder, Dr Casillas Res Hypothyroid     IBS (irritable bowel syndrome) 2007    Nausea & vomiting     Raynaud's disease 2002    to feet      Past Surgical History:   Procedure Laterality Date   [address] COLONOSCOPY  2007    Dr Leal Getting ARTHROSCOPY Left 2/4/2014    LEFT KNEE ARTHROSCOPE WITH PARTIAL MEDIAL MENISCECTOMY, SYNOVECTOMY AND CHONDROPLASTY                                SHOULDER ARTHROSCOPY  9-4-2012    left shoulder arthroscopy with subacromial decompression , distal clavicle excision ,synovectomy,labral debridement    SHOULDER ARTHROSCOPY  10/16/2012    RIGHT SHOULDER ARTHROSCOPE, SUBACROMIAL DECOMPRESSION, DISTAL    UPPER GASTROINTESTINAL ENDOSCOPY  2007    Dr Nielsen [facility]     Family History   Problem Relation Age of Onset    Diabetes Mother     High Blood Pressure Mother     High Blood Pressure Father     Heart Disease Father     High Blood Pressure Maternal Grandfather      Social History     Socioeconomic History    Marital status:      Spouse name: None    Number of children: None    Years of education: None    Highest education level: None   Occupational History    None   Social Needs    Financial resource strain: Not hard at all   [place]il[place] insecurity     Worry: Never true     Inability: None    Transportation needs     Medical: No     Non-medical: No   Tobacco Use    Smoking status: Never Smoker    Smokeless tobacco: Never Used   Substance and Sexual Activity    Alcohol use: No    Drug use: No    Sexual activity: None   Lifestyle    Physical activity     Days per week: None     Minutes per session: None    Stress: None   Relationships    Social connections     Talks on phone: None     Gets together: None     Attends Congregational service: None     Active member of club or organization: None     Attends meetings of clubs or organizations: None     Relationship status: None    Intimate partner violence     Fear of current or ex partner: None     Emotionally abused: None     Physically abused: None     Forced sexual activity: None   Other Topics Concern    None   Social History Narrative    None     Current Outpatient Medications   Medication Sig Dispense Refill    levothyroxine (SYNTHROID) 50 MCG tablet TAKE ONE TABLET BY MOUTH DAILY 90 tablet 0    lisinopril (PRINIVIL;ZESTRIL) 10 MG tablet TAKE ONE TABLET BY MOUTH DAILY 30 tablet 0    omeprazole (PRILOSEC) 40 MG delayed release capsule Take 1 capsule by mouth daily 30 capsule 3    azelastine (ASTELIN) 0.1 % nasal spray 2 sprays by Nasal route 2 times daily Use in each nostril as directed 4 Bottle 1    levalbuterol (XOPENEX HFA) 45 MCG/ACT inhaler Inhale 2 puffs into the lungs every 4 hours as needed for Wheezing 1 Inhaler 1    EPINEPHrine (EPIPEN 2-DEVAN) 0.3 MG/0.3ML JESS injection Inject 0.3 mLs into the muscle once for 1 dose. Use as directed for allergic reaction 2 Device 1     No current facility-administered medications for this visit.       Allergies   Allergen Reactions    Latex Hives    Advil [Ibuprofen Micronized]     Aspirin     Demerol     Estrogens Conjugated      Joint pain      Hydrocodone Hives and Swelling    Macrobid [Nitrofurantoin Monohydrate Macrocrystals]     Prednisone     Vicodin [Hydrocodone-Acetaminophen]    

## 2020-12-22 ENCOUNTER — OFFICE VISIT (OUTPATIENT)
Dept: ENT CLINIC | Age: 60
End: 2020-12-22
Payer: COMMERCIAL

## 2020-12-22 VITALS
HEART RATE: 84 BPM | WEIGHT: 158 LBS | BODY MASS INDEX: 28 KG/M2 | DIASTOLIC BLOOD PRESSURE: 92 MMHG | HEIGHT: 63 IN | SYSTOLIC BLOOD PRESSURE: 149 MMHG | TEMPERATURE: 96.8 F

## 2020-12-22 PROCEDURE — 92504 EAR MICROSCOPY EXAMINATION: CPT | Performed by: OTOLARYNGOLOGY

## 2020-12-22 PROCEDURE — 99204 OFFICE O/P NEW MOD 45 MIN: CPT | Performed by: OTOLARYNGOLOGY

## 2020-12-22 RX ORDER — AMOXICILLIN AND CLAVULANATE POTASSIUM 875; 125 MG/1; MG/1
1 TABLET, FILM COATED ORAL 2 TIMES DAILY
Qty: 14 TABLET | Refills: 0 | Status: SHIPPED | OUTPATIENT
Start: 2020-12-22 | End: 2020-12-29

## 2020-12-22 RX ORDER — CIPROFLOXACIN AND DEXAMETHASONE 3; 1 MG/ML; MG/ML
4 SUSPENSION/ DROPS AURICULAR (OTIC) 2 TIMES DAILY
Qty: 1 BOTTLE | Refills: 0 | Status: SHIPPED | OUTPATIENT
Start: 2020-12-22 | End: 2020-12-29

## 2020-12-22 ASSESSMENT — ENCOUNTER SYMPTOMS
TROUBLE SWALLOWING: 0
SORE THROAT: 0
EYE DISCHARGE: 0
WHEEZING: 0
VOICE CHANGE: 0
BACK PAIN: 0
SINUS PAIN: 0
SINUS PRESSURE: 0
CONSTIPATION: 0
STRIDOR: 0
DIARRHEA: 0
BLOOD IN STOOL: 0
COUGH: 0
NAUSEA: 0
EYE ITCHING: 0
SHORTNESS OF BREATH: 0
CHOKING: 0
COLOR CHANGE: 0
VOMITING: 0
RHINORRHEA: 0
FACIAL SWELLING: 0
PHOTOPHOBIA: 0

## 2020-12-22 NOTE — PROGRESS NOTES
[facility]  [address], [address] [place]  P: [phone]  F: [phone]       Patient     Maty Evans  1960    ChiefComplaint     Chief Complaint   Patient presents with    New Patient     Patient is here today because her left ear has discolored drainage, she also has sinus pressure and when she gets a sinuses infecfection the pain and pressure always goes into her left ear       History of Present Illness     Grisel Elena \"Omro\" is a pleasant 64 y.o. female who presents as a new patient today for left-sided otorrhea. She has been experiencing left-sided otorrhea for the past month. She also has some diminished hearing. When she blows her nose she feels fluid come out her left ear. She has had issues with her ears and her entire life. She had a tympanic membrane perforation around age of 17. Since then, whenever she experiences a sinus or upper respiratory infection, she experiences otorrhea and diminished hearing as well. No problems with the right ear. Denies tinnitus or otalgia. Denies spinning sensation. Denies any history of ear surgeries. Denies family history of ear problems. She has not taken any medications for this current infection.     Past Medical History     Past Medical History:   Diagnosis Date    Asthma     Capsulitis of shoulder 2012    left shoulder, Dr Mica Henry Hypothyroid     IBS (irritable bowel syndrome) 2007    Nausea & vomiting     Raynaud's disease 2002    to feet       Past Surgical History     Past Surgical History:   Procedure Laterality Date     SECTION      COLONOSCOPY      Dr Senia Mercedes ARTHROSCOPY Left 2014    LEFT KNEE ARTHROSCOPE WITH PARTIAL MEDIAL MENISCECTOMY, SYNOVECTOMY AND CHONDROPLASTY                                SHOULDER ARTHROSCOPY  2012 left shoulder arthroscopy with subacromial decompression , distal clavicle excision ,synovectomy,labral debridement    SHOULDER ARTHROSCOPY  10/16/2012    RIGHT SHOULDER ARTHROSCOPE, SUBACROMIAL DECOMPRESSION, DISTAL    UPPER GASTROINTESTINAL ENDOSCOPY  2007    Dr Yanni Canales History     Family History   Problem Relation Age of Onset    Diabetes Mother     High Blood Pressure Mother     High Blood Pressure Father     Heart Disease Father     High Blood Pressure Maternal Grandfather        Social History     Social History     Socioeconomic History    Marital status:      Spouse name: Not on file    Number of children: Not on file    Years of education: Not on file    Highest education level: Not on file   Occupational History    Not on file   Social Needs    Financial resource strain: Not hard at all   [employer] insecurity     Worry: Never true     Inability: Not on file    Transportation needs     Medical: No     Non-medical: No   Tobacco Use    Smoking status: Never Smoker    Smokeless tobacco: Never Used   Substance and Sexual Activity    Alcohol use: No    Drug use: No    Sexual activity: Not on file   Lifestyle    Physical activity     Days per week: Not on file     Minutes per session: Not on file    Stress: Not on file   Relationships    Social connections     Talks on phone: Not on file     Gets together: Not on file     Attends Caodaism service: Not on file     Active member of club or organization: Not on file     Attends meetings of clubs or organizations: Not on file     Relationship status: Not on file    Intimate partner violence     Fear of current or ex partner: Not on file     Emotionally abused: Not on file     Physically abused: Not on file     Forced sexual activity: Not on file   Other Topics Concern    Not on file   Social History Narrative    Not on file       Allergies     Allergies   Allergen Reactions    Latex Hives    Advil [Ibuprofen Micronized]  Aspirin     Demerol     Estrogens Conjugated      Joint pain      Hydrocodone Hives and Swelling    Macrobid [Nitrofurantoin Monohydrate Macrocrystals]     Prednisone     Vicodin [Hydrocodone-Acetaminophen]     Zithromax [Azithromycin Dihydrate]     Zyrtec [Cetirizine Hcl]     Iodine Rash       Medications     Current Outpatient Medications   Medication Sig Dispense Refill    ciprofloxacin-dexamethasone (CIPRODEX) 0.3-0.1 % otic suspension Place 4 drops into the left ear 2 times daily for 7 days 1 Bottle 0    amoxicillin-clavulanate (AUGMENTIN) 875-125 MG per tablet Take 1 tablet by mouth 2 times daily for 7 days 14 tablet 0    levothyroxine (SYNTHROID) 50 MCG tablet TAKE ONE TABLET BY MOUTH DAILY 90 tablet 0    lisinopril (PRINIVIL;ZESTRIL) 10 MG tablet TAKE ONE TABLET BY MOUTH DAILY 30 tablet 0    omeprazole (PRILOSEC) 40 MG delayed release capsule Take 1 capsule by mouth daily 30 capsule 3    azelastine (ASTELIN) 0.1 % nasal spray 2 sprays by Nasal route 2 times daily Use in each nostril as directed 4 Bottle 1    levalbuterol (XOPENEX HFA) 45 MCG/ACT inhaler Inhale 2 puffs into the lungs every 4 hours as needed for Wheezing 1 Inhaler 1    EPINEPHrine (EPIPEN 2-DEVAN) 0.3 MG/0.3ML JESS injection Inject 0.3 mLs into the muscle once for 1 dose. Use as directed for allergic reaction 2 Device 1     No current facility-administered medications for this visit. Review of Systems     Review of Systems   Constitutional: Negative for activity change, appetite change, chills, diaphoresis, fatigue, fever and unexpected weight change. HENT: Positive for ear discharge and hearing loss. Negative for congestion, dental problem, drooling, ear pain, facial swelling, mouth sores, nosebleeds, postnasal drip, rhinorrhea, sinus pressure, sinus pain, sneezing, sore throat, tinnitus, trouble swallowing and voice change. Eyes: Negative for photophobia, discharge, itching and visual disturbance. Respiratory: Negative for cough, choking, shortness of breath, wheezing and stridor. Gastrointestinal: Negative for blood in stool, constipation, diarrhea, nausea and vomiting. Endocrine: Negative for cold intolerance, heat intolerance, polyphagia and polyuria. Musculoskeletal: Negative for back pain, gait problem, neck pain and neck stiffness. Skin: Negative for color change, pallor, rash and wound. Neurological: Negative for dizziness, syncope, facial asymmetry, speech difficulty, light-headedness, numbness and headaches. Hematological: Negative for adenopathy. Does not bruise/bleed easily. Psychiatric/Behavioral: Negative for agitation, confusion and sleep disturbance. PhysicalExam     Vitals:    12/22/20 1043   BP: (!) 149/92   Pulse: 84   Temp: 96.8 °F (36 °C)       Physical Exam  Constitutional:       Appearance: She is well-developed. HENT:      Head: Normocephalic and atraumatic. Not macrocephalic and not microcephalic. No raccoon eyes, Montero's sign, abrasion, contusion, right periorbital erythema, left periorbital erythema or laceration. Hair is normal.      Jaw: No trismus. Right Ear: Hearing, tympanic membrane and external ear normal. No decreased hearing noted. No drainage, swelling or tenderness. No middle ear effusion. No mastoid tenderness. Tympanic membrane is not perforated, retracted or bulging. Tympanic membrane has normal mobility. Left Ear: Hearing and external ear normal. No decreased hearing noted. No drainage, swelling or tenderness. No middle ear effusion. No mastoid tenderness. Tympanic membrane is erythematous. Tympanic membrane is not perforated, retracted or bulging. Tympanic membrane has normal mobility. Nose: No nasal deformity, septal deviation, laceration, mucosal edema or rhinorrhea. Right Sinus: No maxillary sinus tenderness or frontal sinus tenderness. Left Sinus: No maxillary sinus tenderness or frontal sinus tenderness. Mouth/Throat:      Mouth: Mucous membranes are not pale, not dry and not cyanotic. No lacerations or oral lesions. Dentition: Normal dentition. No dental caries or dental abscesses. Pharynx: Uvula midline. No oropharyngeal exudate, posterior oropharyngeal erythema or uvula swelling. Tonsils: No tonsillar abscesses. Eyes:      General: Lids are normal.         Right eye: No discharge. Left eye: No discharge. Extraocular Movements:      Right eye: Normal extraocular motion and no nystagmus. Left eye: Normal extraocular motion and no nystagmus. Conjunctiva/sclera:      Right eye: No chemosis or exudate. Left eye: No chemosis or exudate. Neck:      Musculoskeletal: Neck supple. Thyroid: No thyroid mass or thyromegaly. Vascular: Normal carotid pulses. Trachea: No tracheal tenderness or tracheal deviation. Cardiovascular:      Rate and Rhythm: Normal rate and regular rhythm. Pulmonary:      Effort: No tachypnea, bradypnea or respiratory distress. Breath sounds: No stridor. Musculoskeletal:      Right shoulder: She exhibits normal range of motion. Lymphadenopathy:      Head:      Right side of head: No submental, submandibular, tonsillar, preauricular, posterior auricular or occipital adenopathy. Left side of head: No submental, submandibular, tonsillar, preauricular, posterior auricular or occipital adenopathy. Cervical: No cervical adenopathy. Right cervical: No superficial, deep or posterior cervical adenopathy. Left cervical: No superficial, deep or posterior cervical adenopathy. Skin:     General: Skin is warm and dry. Findings: No bruising, erythema, laceration, lesion or rash. Neurological:      Mental Status: She is alert and oriented to person, place, and time. Cranial Nerves: No cranial nerve deficit.    Psychiatric:         Speech: Speech normal.         Behavior: Behavior normal.           Procedure Otomicroscopy    An operating microscope was utilized to visualize the external auditory canals using a 4mm speculum. Left external auditory canal with some mild purulent debris. Tympanic membrane erythematous with few polypoid areas of inflammation. Purulent drainage upon tympanic membrane. Obvious perforation not visualized, however appears assuming pinhead sized perforation given appearance of tympanic membrane. .        Assessment and Plan     1. Recurrent acute suppurative otitis media with spontaneous rupture of left tympanic membrane  Patient here today with left recurrent otitis media with perforation. Has symptoms of hearing loss and otorrhea. Otomicroscopy performed with aural toilet. Will place patient on Ciprodex and Augmentin. Follow-up 2 weeks with audiogram.  - ciprofloxacin-dexamethasone (CIPRODEX) 0.3-0.1 % otic suspension; Place 4 drops into the left ear 2 times daily for 7 days  Dispense: 1 Bottle; Refill: 0  - amoxicillin-clavulanate (AUGMENTIN) 875-125 MG per tablet; Take 1 tablet by mouth 2 times daily for 7 days  Dispense: 14 tablet; Refill: 0      Return in about 2 weeks (around 1/5/2021) for audiogram prior. Portions of this note were dictated using Dragon.  There may be linguistic errors secondary to the use of this program.

## 2021-01-04 NOTE — PROGRESS NOTES
Jerson Fernandez   1960, 64 y.o. female   [id number]       Referring Provider: Shantanuraphael Nixon DO  Referral Type: In an order in [address] [place] [address]    Reason for Visit: Evaluation of suspected change in hearing, tinnitus, or balance. ADULT AUDIOLOGIC EVALUATION      Jerson Fernandez is a 64 y.o. female seen today, 1/5/2021 for an initial audiologic evaluation. She was seen by Dr. Neela Sommer on 12/22/2020. Temperature taken at intake: within normal limits    AUDIOLOGIC AND OTHER PERTINENT MEDICAL HISTORY:        Jerson Fernandez noted history left-sided otorrhea for about 1 month, saw Dr. Neela Sommer on 12/22/2020, noted drainage had resolved after 7 days of ear drops; some concern for decreased hearing LE>RE, difficulty hearing some people, including her  who is being treated for dysphonia, and her daughter; dizziness - feels imbalanced, attributes to blood pressure medication; history of left TM perforation at age 17. Malachi Corie Elena denied otalgia, aural fullness, tinnitus, history of occupational/recreational noise exposure, history of head trauma, and history of ear surgery. IMPRESSIONS:       Today's results are consistent with left ear high frequency sensorineural hearing loss with right ear within normal limits; excellent word recognition bilaterally for soft conversational speech. Discussed good communication strategies; discussed excellent word recognition for soft conversation and limited benefit from hearing aids at this time. Follow medical recommendations from Dr. Neela Sommer. ASSESSMENT AND FINDINGS:       Otoscopy revealed: Clear ear canals bilaterally      RIGHT EAR:  Hearing Sensitivity: Within normal limits. Speech Recognition Threshold: 15 dB HL  Word Recognition: Excellent (92%), based on NU-6 25-word list at 50 dBHL using recorded speech stimuli. This finding is consistent with hearing sensitivity. Tympanometry: Normal peak pressure with high compliance, Type Ad tympanogram, consistent with hypermobile tympanic membrane. LEFT EAR:  Hearing Sensitivity: Within normal limits through 4000 Hz precipitously sloping to mild high frequency sensorineural hearing loss. Speech Recognition Threshold: 25 dB HL, masked  Word Recognition: Excellent (96%), based on NU-6 25-word list at 50 dBHL using recorded speech stimuli. This finding is consistent with hearing sensitivity. Tympanometry: Normal peak pressure and compliance, Type A tympanogram, consistent with normal middle ear function. NOTE: An asymmetry of 20-30 dBHL is present 0722-2892 Hz with left ear worse than right ear. Reliability: Good  Transducer: Inserts, checked with Phones    See scanned audiogram dated 1/5/2021 for results. PATIENT EDUCATION:       The following items were discussed with the patient:    - Good Communication Strategies    - Hearing Loss and Hearing Aids    Educational information was shared in the After Visit Summary. RECOMMENDATIONS:                                                                                                                                                                                                                                                                      The following items are recommended based on patient report and results from today's appointment:   - Continue medical follow-up with Linnea Farooq DO.   - Retest hearing as medically indicated and/or sooner if a change in hearing is noted. - Utilize \"Good Communication Strategies\" as discussed to assist in speech understanding with communication partners. [facility] [place], [place]  Audiologist      Chart CC'd to:  Linnea Farooq DO      Degree of   Hearing Sensitivity dB Range   Within Normal Limits (WNL) 0 - 20   Mild 20 - 40   Moderate 40 - 55 Moderately-Severe 55 - 70   Severe 70 - 90   Profound 90 +

## 2021-01-05 ENCOUNTER — OFFICE VISIT (OUTPATIENT)
Dept: ENT CLINIC | Age: 61
End: 2021-01-05
Payer: COMMERCIAL

## 2021-01-05 ENCOUNTER — PROCEDURE VISIT (OUTPATIENT)
Dept: AUDIOLOGY | Age: 61
End: 2021-01-05
Payer: COMMERCIAL

## 2021-01-05 VITALS
HEIGHT: 63 IN | TEMPERATURE: 97.5 F | BODY MASS INDEX: 28 KG/M2 | HEART RATE: 78 BPM | WEIGHT: 158 LBS | DIASTOLIC BLOOD PRESSURE: 81 MMHG | SYSTOLIC BLOOD PRESSURE: 135 MMHG

## 2021-01-05 DIAGNOSIS — H66.015 RECURRENT ACUTE SUPPURATIVE OTITIS MEDIA WITH SPONTANEOUS RUPTURE OF LEFT TYMPANIC MEMBRANE: Primary | ICD-10-CM

## 2021-01-05 DIAGNOSIS — H90.42 SENSORINEURAL HEARING LOSS (SNHL) OF LEFT EAR WITH UNRESTRICTED HEARING OF RIGHT EAR: Primary | ICD-10-CM

## 2021-01-05 DIAGNOSIS — R42 DIZZINESS AND GIDDINESS: ICD-10-CM

## 2021-01-05 PROCEDURE — 92504 EAR MICROSCOPY EXAMINATION: CPT | Performed by: OTOLARYNGOLOGY

## 2021-01-05 PROCEDURE — 92567 TYMPANOMETRY: CPT | Performed by: AUDIOLOGIST

## 2021-01-05 PROCEDURE — 92557 COMPREHENSIVE HEARING TEST: CPT | Performed by: AUDIOLOGIST

## 2021-01-05 PROCEDURE — 99213 OFFICE O/P EST LOW 20 MIN: CPT | Performed by: OTOLARYNGOLOGY

## 2021-01-05 ASSESSMENT — ENCOUNTER SYMPTOMS
COUGH: 0
SORE THROAT: 0
VOICE CHANGE: 0
RHINORRHEA: 0
FACIAL SWELLING: 0
DIARRHEA: 0
EYE ITCHING: 0
SINUS PRESSURE: 0
PHOTOPHOBIA: 0
STRIDOR: 0
NAUSEA: 0
SINUS PAIN: 0
TROUBLE SWALLOWING: 0
CHOKING: 0
EYE REDNESS: 0
COLOR CHANGE: 0
SHORTNESS OF BREATH: 0
EYE PAIN: 0

## 2021-01-05 NOTE — PATIENT INSTRUCTIONS
Good Communication Strategies    Communication can be challenging for anyone, but can be especially difficult for those with some degree of hearing loss. While we may not be able to control every factor that may lead to difficulty with communication, there are Good Communication Strategies that we can all use in our day-to-day lives. Communication takes both parties working together for it to be successful. Tips as a Listener:   1. Control your environment. It is important to limit the amount of background noise in the room when possible. You should also consider having a good light source in the room to best see the other person. 2. Ask for clarification. Instead of saying \"What?\", you can use parts of what you heard to make a new question. For example, if you heard the word \"Thursday\" but not the rest of the week, you may ask \"What was that about Thursday? \" or \"What did you want to do Thursday? \". This shows the person talking that you are listening and will help them better explain what they are saying. 3. Be an advocate for yourself. If you are hearing but not understanding, tell the other person \"I can hear you, but I need you to slow down when you speak. \"  Or if someone is facing the other direction, say \"I cannot hear you when you are not looking at me when we talk. \"       Tips as a Talker:   - Sit or stand 3 to 6 feet away to maximize audibility         -- It is unrealistic to believe someone else will fully hear your message if you are speaking from across the room or in a different room in the house   - Stay at eye level to help with visual cues   - Make sure you have the persons attention before speaking   - Use facial expressions and gestures to accentuate your message   - Raise your voice slightly (do not scream)   - Speak slowly and distinctly   - Use short, simple sentences   - Rephrase your words if the person is having a hard time understanding you Hearing loss is a sudden or slow decrease in how well you hear. It can range from mild to profound. Permanent hearing loss can occur with aging, and it can happen when you are exposed long-term to loud noise. Examples include listening to loud music, riding motorcycles, or being around other loud machines. Hearing loss can affect your work and home life. It can make you feel lonely or depressed. You may feel that you have lost your independence. But hearing aids and other devices can help you hear better and feel connected to others. Follow-up care is a key part of your treatment and safety. Be sure to make and go to all appointments, and call your doctor if you are having problems. It's also a good idea to know your test results and keep a list of the medicines you take. How can you care for yourself at home? · Avoid loud noises whenever possible. This helps keep your hearing from getting worse. Always wear hearing protection around loud noises. · If appropriate, wear hearing aid(s) as directed. It is recommended that hearing aids are worn during all waking hours to keep your brain active and give it access to the sounds it is missing. · If you are beginning your process with hearing aid(s), schedule a \"Hearing Aid Evaluation\" with an audiologist to discuss your lifestyle, features of hearing aid technology, and styles of hearing aids available. It is recommended that you contact your insurance company to determine if you have a hearing aid benefit, as this may dictate who you can see for these services. · Have hearing tests as your doctor suggests. They can show whether your hearing has changed. Your hearing aid may need to be adjusted. · Use other assistive devices as needed. These may include:  ? Telephone amplifiers and hearing aids that can connect to a television, stereo, radio, or microphone. ? Devices that use lights or vibrations. These alert you to the doorbell, a ringing telephone, or a baby monitor. ? Television closed-captioning. This shows the words at the bottom of the screen. Most new TVs can do this. ? TTY (text telephone). This lets you type messages back and forth on the telephone instead of talking or listening. These devices are also called TDD. When messages are typed on the keyboard, they are sent over the phone line to a receiving TTY. The message is shown on a monitor. · Use pagers, fax machines, text, and email if it is hard for you to communicate by telephone. · Try to learn a listening technique called speech-reading. It is not lip-reading. You pay attention to people's gestures, expressions, posture, and tone of voice. These clues can help you understand what a person is saying. Face the person you are talking to, and have him or her face you. Make sure the lighting is good. You need to see the other person's face clearly. · Think about counseling if you need help to adjust to your hearing loss. When should you call for help? Watch closely for changes in your health, and be sure to contact your doctor if:    · You think your hearing is getting worse. · You have new symptoms, such as dizziness or nausea.

## 2021-01-05 NOTE — PROGRESS NOTES
[facility]  [facility] [address], [address] [place] [address] Av  P: [phone]  F: [phone]       Patient     Antoine Mejia  1960    ChiefComplaint     Chief Complaint   Patient presents with    Follow-up     Patient is here today to follow up on her left ear, her ear is doing much better since her last visit and she is not having any issues or complaints at this time       History of Present Illness     Antoine Mejia is a pleasant 64 y.o. female here for follow-up for left otitis media with tympanic membrane perforation. Audiogram performed in the office today. Audiometry reveals mild left-sided high-frequency sensorineural hearing loss at 6008 1000 Hz. Remainder of the hearing is normal to borderline mild loss. Right tympanogram is hypermobile left type A. Word recognition scores excellent. Today patient reports improvement of symptoms. She states they are nearly resolved. No drainage, pain, otorrhea or hearing loss.     Past Medical History     Past Medical History:   Diagnosis Date    Asthma     Capsulitis of shoulder 2012    left shoulder, Dr Padmini Hinds Hypothyroid     IBS (irritable bowel syndrome) 2007    Nausea & vomiting     Raynaud's disease 2002    to feet       Past Surgical History     Past Surgical History:   Procedure Laterality Date     SECTION      COLONOSCOPY      Dr Autumn Bojorquez ARTHROSCOPY Left 2014    LEFT KNEE ARTHROSCOPE WITH PARTIAL MEDIAL MENISCECTOMY, SYNOVECTOMY AND CHONDROPLASTY                                SHOULDER ARTHROSCOPY  2012    left shoulder arthroscopy with subacromial decompression , distal clavicle excision ,synovectomy,labral debridement    SHOULDER ARTHROSCOPY  10/16/2012    RIGHT SHOULDER ARTHROSCOPE, SUBACROMIAL DECOMPRESSION, DISTAL    UPPER GASTROINTESTINAL ENDOSCOPY      Dr Chiquis Jones History     Family History   Problem Relation Age of Onset    Diabetes Mother Medication Sig Dispense Refill    levothyroxine (SYNTHROID) 50 MCG tablet TAKE ONE TABLET BY MOUTH DAILY 90 tablet 0    lisinopril (PRINIVIL;ZESTRIL) 10 MG tablet TAKE ONE TABLET BY MOUTH DAILY 30 tablet 0    omeprazole (PRILOSEC) 40 MG delayed release capsule Take 1 capsule by mouth daily 30 capsule 3    azelastine (ASTELIN) 0.1 % nasal spray 2 sprays by Nasal route 2 times daily Use in each nostril as directed 4 Bottle 1    levalbuterol (XOPENEX HFA) 45 MCG/ACT inhaler Inhale 2 puffs into the lungs every 4 hours as needed for Wheezing 1 Inhaler 1    EPINEPHrine (EPIPEN 2-DEVAN) 0.3 MG/0.3ML JESS injection Inject 0.3 mLs into the muscle once for 1 dose. Use as directed for allergic reaction 2 Device 1     No current facility-administered medications for this visit. Review of Systems     Review of Systems   Constitutional: Negative for chills, fatigue and fever. HENT: Negative for congestion, ear discharge, ear pain, facial swelling, hearing loss, nosebleeds, postnasal drip, rhinorrhea, sinus pressure, sinus pain, sneezing, sore throat, tinnitus, trouble swallowing and voice change. Eyes: Negative for photophobia, pain, redness, itching and visual disturbance. Respiratory: Negative for cough, choking, shortness of breath and stridor. Gastrointestinal: Negative for diarrhea and nausea. Musculoskeletal: Negative for neck pain and neck stiffness. Skin: Negative for color change and rash. Neurological: Negative for dizziness, facial asymmetry and light-headedness. Hematological: Negative for adenopathy. Psychiatric/Behavioral: Negative for agitation and confusion. PhysicalExam     Vitals:    01/05/21 1506   BP: 135/81   Pulse: 78   Temp: 97.5 °F (36.4 °C)       Physical Exam  Constitutional:       Appearance: She is well-developed. HENT:      Head: Normocephalic and atraumatic. Jaw: No trismus. Right Ear: Tympanic membrane, ear canal and external ear normal. No drainage. No middle ear effusion. Tympanic membrane is not perforated. Left Ear: Tympanic membrane, ear canal and external ear normal. No drainage. No middle ear effusion. Tympanic membrane is not perforated. Nose: No septal deviation, mucosal edema or rhinorrhea. Mouth/Throat:      Dentition: Normal dentition. Pharynx: Uvula midline. No oropharyngeal exudate. Eyes:      General: No scleral icterus. Right eye: No discharge. Left eye: No discharge. Pupils: Pupils are equal, round, and reactive to light. Neck:      Musculoskeletal: Neck supple. Thyroid: No thyromegaly. Trachea: Phonation normal. No tracheal deviation. Pulmonary:      Effort: Pulmonary effort is normal. No respiratory distress. Breath sounds: No stridor. Lymphadenopathy:      Cervical: No cervical adenopathy. Skin:     General: Skin is warm and dry. Neurological:      Mental Status: She is alert and oriented to person, place, and time. Cranial Nerves: No cranial nerve deficit. Psychiatric:         Behavior: Behavior normal.           Procedure     Otomicroscopy    An operating microscope was utilized to visualize the external auditory canals using a 4mm speculum. The external auditory canals are clear. The tympanic membrane is intact. Ossicles appear intact. No fluid visualized in the middle ear. Interval resolution of otitis media        Assessment and Plan     1. Recurrent acute suppurative otitis media with spontaneous rupture of left tympanic membrane  Interval resolution of left otitis media with perforation. Tympanic membrane intact. Audiogram performed today and reviewed with the patient. Slight high-frequency asymmetry likely secondary to possible residual inflammation from infection. Follow-up as needed. Return if symptoms worsen or fail to improve. Portions of this note were dictated using Dragon.  There may be linguistic errors secondary to the use of this program.

## 2021-01-11 RX ORDER — LEVOTHYROXINE SODIUM 0.05 MG/1
TABLET ORAL
Qty: 90 TABLET | Refills: 0 | Status: SHIPPED | OUTPATIENT
Start: 2021-01-11 | End: 2021-04-20

## 2021-03-06 DIAGNOSIS — I10 ESSENTIAL HYPERTENSION: ICD-10-CM

## 2021-03-08 RX ORDER — LISINOPRIL 10 MG/1
TABLET ORAL
Qty: 180 TABLET | Refills: 0 | Status: SHIPPED | OUTPATIENT
Start: 2021-03-08 | End: 2021-09-09 | Stop reason: SDUPTHER

## 2021-03-09 ENCOUNTER — TELEPHONE (OUTPATIENT)
Dept: PRIMARY CARE CLINIC | Age: 61
End: 2021-03-09

## 2021-03-09 NOTE — TELEPHONE ENCOUNTER
I do not think she has that option to pick  She can check with the person was adrienne give her the vaccine with her allergies

## 2021-03-09 NOTE — TELEPHONE ENCOUNTER
Pt is signed up to have the Vaccine on Thursday and she wanted know if Chance Galvan will let her know which one she should have Alvina Nagel or J&J ? She stated that with all her allergies she is concerned please advise.

## 2021-03-15 RX ORDER — AZELASTINE 1 MG/ML
SPRAY, METERED NASAL
Qty: 1 BOTTLE | Refills: 0 | Status: SHIPPED | OUTPATIENT
Start: 2021-03-15 | End: 2021-09-07

## 2021-03-15 NOTE — TELEPHONE ENCOUNTER
Medication:   Requested Prescriptions     Pending Prescriptions Disp Refills    azelastine (ASTELIN) 0.1 % nasal spray [Pharmacy Med Name: AZELASTINE 0.1% (137 MCG) SPRY] 1 Bottle 0     Sig: SPRAY TWO SPRAYS IN EACH NOSTRIL TWICE DAILY     Last Filled:  04/21/20    Last appt: 12/8/2020   Next appt: Visit date not found    Last OARRS: No flowsheet data found.

## 2021-04-19 NOTE — TELEPHONE ENCOUNTER
Medication:   Requested Prescriptions     Pending Prescriptions Disp Refills    levothyroxine (SYNTHROID) 50 MCG tablet [Pharmacy Med Name: LEVOTHYROXINE 50 MCG TABLET] 90 tablet 0     Sig: TAKE ONE TABLET BY MOUTH DAILY     Last Filled:  1.11.21    Last appt: 12/8/2020   Next appt: Visit date not found    Last Thyroid:   Lab Results   Component Value Date    TSH 1.61 12/27/2019    FT3 2.7 05/18/2015    [id number] 9.8 05/18/2015

## 2021-04-20 RX ORDER — LEVALBUTEROL TARTRATE 45 UG/1
2 AEROSOL, METERED ORAL EVERY 4 HOURS PRN
Qty: 1 INHALER | Refills: 1 | Status: SHIPPED | OUTPATIENT
Start: 2021-04-20 | End: 2022-03-15

## 2021-04-20 RX ORDER — LEVOTHYROXINE SODIUM 0.05 MG/1
TABLET ORAL
Qty: 90 TABLET | Refills: 0 | Status: SHIPPED | OUTPATIENT
Start: 2021-04-20 | End: 2021-07-14

## 2021-04-20 NOTE — TELEPHONE ENCOUNTER
Pt needs a refill:    [facility]ACT [facility]er [[id number]  90 days supply    JERAD RUBIO [address] [facility] 12.8.20

## 2021-06-01 ENCOUNTER — HOSPITAL ENCOUNTER (OUTPATIENT)
Dept: WOMENS IMAGING | Age: 61
Discharge: HOME | End: 2021-06-01
Payer: COMMERCIAL

## 2021-06-01 VITALS — BODY MASS INDEX: 25.78 KG/M2 | HEIGHT: 64 IN | WEIGHT: 151 LBS

## 2021-06-01 DIAGNOSIS — Z12.31 VISIT FOR SCREENING MAMMOGRAM: ICD-10-CM

## 2021-06-01 PROCEDURE — 77067 SCR MAMMO BI INCL CAD: CPT

## 2021-07-14 RX ORDER — LEVOTHYROXINE SODIUM 0.05 MG/1
TABLET ORAL
Qty: 90 TABLET | Refills: 0 | Status: SHIPPED | OUTPATIENT
Start: 2021-07-14 | End: 2021-10-07

## 2021-09-09 ENCOUNTER — OFFICE VISIT (OUTPATIENT)
Dept: PRIMARY CARE CLINIC | Age: 61
End: 2021-09-09
Payer: COMMERCIAL

## 2021-09-09 VITALS
DIASTOLIC BLOOD PRESSURE: 91 MMHG | TEMPERATURE: 98.8 F | BODY MASS INDEX: 26.77 KG/M2 | HEIGHT: 64 IN | SYSTOLIC BLOOD PRESSURE: 152 MMHG | HEART RATE: 81 BPM | OXYGEN SATURATION: 98 % | WEIGHT: 156.8 LBS

## 2021-09-09 DIAGNOSIS — I10 ESSENTIAL HYPERTENSION: Primary | ICD-10-CM

## 2021-09-09 DIAGNOSIS — Z23 NEED FOR INFLUENZA VACCINATION: ICD-10-CM

## 2021-09-09 DIAGNOSIS — E03.9 ACQUIRED HYPOTHYROIDISM: ICD-10-CM

## 2021-09-09 PROCEDURE — 90674 CCIIV4 VAC NO PRSV 0.5 ML IM: CPT | Performed by: FAMILY MEDICINE

## 2021-09-09 PROCEDURE — 90471 IMMUNIZATION ADMIN: CPT | Performed by: FAMILY MEDICINE

## 2021-09-09 PROCEDURE — 99214 OFFICE O/P EST MOD 30 MIN: CPT | Performed by: FAMILY MEDICINE

## 2021-09-09 RX ORDER — LISINOPRIL 10 MG/1
10 TABLET ORAL DAILY
Qty: 90 TABLET | Refills: 0 | Status: SHIPPED | OUTPATIENT
Start: 2021-09-09 | End: 2021-09-09 | Stop reason: SDUPTHER

## 2021-09-09 RX ORDER — LISINOPRIL 5 MG/1
5 TABLET ORAL DAILY
Qty: 30 TABLET | Refills: 2 | Status: SHIPPED
Start: 2021-09-09 | End: 2022-03-15 | Stop reason: DRUGHIGH

## 2021-09-09 SDOH — HEALTH STABILITY: PHYSICAL HEALTH: ON AVERAGE, HOW MANY DAYS PER WEEK DO YOU ENGAGE IN MODERATE TO STRENUOUS EXERCISE (LIKE A BRISK WALK)?: 6 DAYS

## 2021-09-09 SDOH — SOCIAL STABILITY: SOCIAL NETWORK
IN A TYPICAL WEEK, HOW MANY TIMES DO YOU TALK ON THE PHONE WITH FAMILY, FRIENDS, OR NEIGHBORS?: MORE THAN THREE TIMES A WEEK

## 2021-09-09 SDOH — SOCIAL STABILITY: SOCIAL NETWORK: HOW OFTEN DO YOU GET TOGETHER WITH FRIENDS OR RELATIVES?: MORE THAN THREE TIMES A WEEK

## 2021-09-09 SDOH — SOCIAL STABILITY: SOCIAL NETWORK: HOW OFTEN DO YOU ATTENT MEETINGS OF THE CLUB OR ORGANIZATION YOU BELONG TO?: NEVER

## 2021-09-09 SDOH — ECONOMIC STABILITY: HOUSING INSECURITY: IN THE LAST 12 MONTHS, HOW MANY PLACES HAVE YOU LIVED?: 1

## 2021-09-09 SDOH — SOCIAL STABILITY: SOCIAL NETWORK: HOW OFTEN DO YOU ATTEND CHURCH OR RELIGIOUS SERVICES?: MORE THAN 4 TIMES PER YEAR

## 2021-09-09 SDOH — HEALTH STABILITY: MENTAL HEALTH
STRESS IS WHEN SOMEONE FEELS TENSE, NERVOUS, ANXIOUS, OR CAN'T SLEEP AT NIGHT BECAUSE THEIR MIND IS TROUBLED. HOW STRESSED ARE YOU?: NOT AT ALL

## 2021-09-09 SDOH — ECONOMIC STABILITY: FOOD INSECURITY: WITHIN THE PAST 12 MONTHS, YOU WORRIED THAT YOUR FOOD WOULD RUN OUT BEFORE YOU GOT MONEY TO BUY MORE.: NEVER TRUE

## 2021-09-09 SDOH — SOCIAL STABILITY: SOCIAL NETWORK
DO YOU BELONG TO ANY CLUBS OR ORGANIZATIONS SUCH AS CHURCH GROUPS UNIONS, FRATERNAL OR ATHLETIC GROUPS, OR SCHOOL GROUPS?: YES

## 2021-09-09 SDOH — ECONOMIC STABILITY: INCOME INSECURITY: HOW HARD IS IT FOR YOU TO PAY FOR THE VERY BASICS LIKE FOOD, HOUSING, MEDICAL CARE, AND HEATING?: NOT HARD AT ALL

## 2021-09-09 SDOH — ECONOMIC STABILITY: INCOME INSECURITY: IN THE LAST 12 MONTHS, WAS THERE A TIME WHEN YOU WERE NOT ABLE TO PAY THE MORTGAGE OR RENT ON TIME?: NO

## 2021-09-09 SDOH — ECONOMIC STABILITY: HOUSING INSECURITY
IN THE LAST 12 MONTHS, WAS THERE A TIME WHEN YOU DID NOT HAVE A STEADY PLACE TO SLEEP OR SLEPT IN A SHELTER (INCLUDING NOW)?: NO

## 2021-09-09 SDOH — SOCIAL STABILITY: SOCIAL NETWORK: ARE YOU MARRIED, WIDOWED, DIVORCED, SEPARATED, NEVER MARRIED, OR LIVING WITH A PARTNER?: MARRIED

## 2021-09-09 SDOH — HEALTH STABILITY: MENTAL HEALTH: HOW OFTEN DO YOU HAVE A DRINK CONTAINING ALCOHOL?: NEVER

## 2021-09-09 SDOH — HEALTH STABILITY: PHYSICAL HEALTH: ON AVERAGE, HOW MANY MINUTES DO YOU ENGAGE IN EXERCISE AT THIS LEVEL?: 40 MIN

## 2021-09-09 SDOH — ECONOMIC STABILITY: FOOD INSECURITY: WITHIN THE PAST 12 MONTHS, THE FOOD YOU BOUGHT JUST DIDN'T LAST AND YOU DIDN'T HAVE MONEY TO GET MORE.: NEVER TRUE

## 2021-09-09 ASSESSMENT — PATIENT HEALTH QUESTIONNAIRE - PHQ9
SUM OF ALL RESPONSES TO PHQ9 QUESTIONS 1 & 2: 0
2. FEELING DOWN, DEPRESSED OR HOPELESS: NOT AT ALL
DEPRESSION UNABLE TO ASSESS: YES
1. LITTLE INTEREST OR PLEASURE IN DOING THINGS: NOT AT ALL

## 2021-09-09 NOTE — PROGRESS NOTES
SUBJECTIVE:  Patient ID: Nlies Cheek is a 63 y.o. y.o. female     Hypertension    Arm Pain        Hypertension: Patient here for follow-up of elevated blood pressure. She is exercising and is adherent to low salt diet. Blood pressure is well controlled at home. Cardiac symptoms none. Patient denies chest pain, chest pressure/discomfort, dyspnea, exertional chest pressure/discomfort, fatigue, lower extremity edema, near-syncope, palpitations and syncope. Cardiovascular risk factors: hypertension. Use of agents associated with hypertension: none. History of target organ damage: none. Tolerating medicine well no concern  Anxiety: Patient complains of evaluation of anxiety disorder. She has the following anxiety symptoms: difficulty concentrating, feelings of losing control, irritable, psychomotor agitation. Onset of symptoms was approximately several months ago, unchanged since that time. She denies current suicidal and homicidal ideation. Family history significant for no psychiatric illness. Possible organic causes contributing are: none. Risk factors: none Previous treatment includes none and none. She complains of the following side effects from the treatment: none.   Patient with occasional arm pain in certain positions she is continue to monitor get worse she will let me know    Past Medical History:   Diagnosis Date    Asthma     Capsulitis of shoulder Feb 2012    left shoulder, Dr Mandy Fournier Hypothyroid     IBS (irritable bowel syndrome) 2007    Nausea & vomiting     Raynaud's disease 2002    to feet      Past Surgical History:   Procedure Laterality Date   [address]    COLONOSCOPY  2007    Dr Alfonso Meals ARTHROSCOPY Left 2/4/2014    LEFT KNEE ARTHROSCOPE WITH PARTIAL MEDIAL MENISCECTOMY, SYNOVECTOMY AND CHONDROPLASTY                                SHOULDER ARTHROSCOPY  9-4-2012    left shoulder arthroscopy with subacromial decompression , distal clavicle excision ,synovectomy,labral debridement    SHOULDER ARTHROSCOPY  10/16/2012    RIGHT SHOULDER ARTHROSCOPE, SUBACROMIAL DECOMPRESSION, DISTAL    UPPER GASTROINTESTINAL ENDOSCOPY  2007    Dr Tracy Leong     Family History   Problem Relation Age of Onset    Diabetes Mother     High Blood Pressure Mother     High Blood Pressure Father     Heart Disease Father     High Blood Pressure Maternal Grandfather      Social History     Socioeconomic History    Marital status:      Spouse name: None    Number of children: None    Years of education: None    Highest education level: None   Occupational History    None   Tobacco Use    Smoking status: Never Smoker    Smokeless tobacco: Never Used   Substance and Sexual Activity    Alcohol use: No    Drug use: No    Sexual activity: None   Other Topics Concern    None   Social History Narrative    None     Social Determinants of Health     Financial Resource Strain: Low Risk     Difficulty of Paying Living Expenses: Not hard at all   Food Insecurity: No Food Insecurity    Worried About Running Out of Food in the Last Year: Never true    [place] of Food in the Last Year: Never true   Transportation Needs: No Transportation Needs    Lack of Transportation (Medical): No    Lack of Transportation (Non-Medical): No   Physical Activity: Sufficiently Active    Days of Exercise per Week: 6 days    Minutes of Exercise per Session: 40 min   Stress: No Stress Concern Present    Feeling of Stress : Not at all   Social Connections: Socially Integrated    Frequency of Communication with Friends and Family: More than three times a week    Frequency of Social Gatherings with Friends and Family: More than three times a week    Attends [employer] Services: More than 4 times per year   [employer] Group or Organizations:  Yes    Attends Club or Organization Meetings: Never    Marital Status:    Intimate Partner Violence:     Fear of Current or Ex-Partner:     Emotionally Abused:     Physically Abused:     Sexually Abused:      Current Outpatient Medications   Medication Sig Dispense Refill    lisinopril (PRINIVIL;ZESTRIL) 10 MG tablet Take 1 tablet by mouth daily 90 tablet 0    levothyroxine (SYNTHROID) 50 MCG tablet TAKE ONE TABLET BY MOUTH DAILY 90 tablet 0    levalbuterol (XOPENEX HFA) 45 MCG/ACT inhaler Inhale 2 puffs into the lungs every 4 hours as needed for Wheezing 1 Inhaler 1    azelastine (ASTELIN) 0.1 % nasal spray SPRAY TWO SPRAYS IN EACH NOSTRIL TWICE DAILY (Patient not taking: Reported on 9/9/2021) 30 mL 0    omeprazole (PRILOSEC) 40 MG delayed release capsule Take 1 capsule by mouth daily (Patient not taking: Reported on 9/9/2021) 30 capsule 3    EPINEPHrine (EPIPEN 2-DEVAN) 0.3 MG/0.3ML JESS injection Inject 0.3 mLs into the muscle once for 1 dose. Use as directed for allergic reaction 2 Device 1     No current facility-administered medications for this visit. Allergies   Allergen Reactions    Latex Hives    Advil [Ibuprofen Micronized]     Aspirin     Demerol     Estrogens Conjugated      Joint pain      Hydrocodone Hives and Swelling    Macrobid [Nitrofurantoin Monohydrate Macrocrystals]     Prednisone     Vicodin [Hydrocodone-Acetaminophen]     Zithromax [Azithromycin Dihydrate]     Zyrtec [Cetirizine Hcl]     Iodine Rash       Review of Systems    OBJECTIVE:  BP (!) 152/91 (Site: Left Wrist, Position: Sitting, Cuff Size: Small Adult)   Pulse 81   Temp 98.8 °F (37.1 °C) (Oral)   Ht 5' 4\" (1.626 m)   Wt 156 lb 12.8 oz (71.1 kg)   LMP 01/23/2014 (Approximate)   SpO2 98%   BMI 26.91 kg/m²     Physical Exam  Vitals reviewed. HENT:      Left Ear:  No middle ear effusion. Tympanic membrane is not erythematous. Eyes:      General: No scleral icterus. Conjunctiva/sclera: Conjunctivae normal.   Neck:      Thyroid: No thyromegaly. Vascular: No JVD. Cardiovascular:      Rate and Rhythm: Normal rate and regular rhythm. Heart sounds: Normal heart sounds. No murmur heard. No friction rub. No gallop. Pulmonary:      Effort: Pulmonary effort is normal.      Breath sounds: Normal breath sounds. No wheezing or rales. Abdominal:      General: Bowel sounds are normal. There is no distension. Palpations: Abdomen is soft. There is no mass. Tenderness: There is no abdominal tenderness. Hernia: No hernia is present. Lymphadenopathy:      Cervical: No cervical adenopathy. Skin:     Findings: No rash. Neurological:      Mental Status: She is alert and oriented to person, place, and time. ASSESSMENT:   Diagnosis Orders   1. Essential hypertension  CBC Auto Differential    TSH without Reflex    Hemoglobin A1C    Hepatic Function Panel    Basic Metabolic Panel    Lipid Panel    lisinopril (PRINIVIL;ZESTRIL) 5 MG tablet    DISCONTINUED: lisinopril (PRINIVIL;ZESTRIL) 10 MG tablet   2. Need for influenza vaccination  INFLUENZA, MDCK QUADV, 2 YRS AND OLDER, IM, PF, PREFILL SYR OR SDV, 0.5ML (FLUCELVAX QUADV, PF)   3.  Acquired hypothyroidism           PLAN:    See orders   Lisinopril 5 mg   Monitor BP at home   Call with any concern

## 2021-09-09 NOTE — PROGRESS NOTES
Vaccine Information Sheet, \"Influenza - Inactivated\"  given to [employer], or parent/legal guardian of  [employer] and verbalized understanding. Patient responses:    Have you ever had a reaction to a flu vaccine? No  Are you able to eat eggs without adverse effects? Yes  Do you have any current illness? No  Have you ever had [place] Syndrome? No    Flu vaccine given per order. Please see immunization tab.

## 2021-10-07 RX ORDER — LEVOTHYROXINE SODIUM 0.05 MG/1
TABLET ORAL
Qty: 90 TABLET | Refills: 0 | Status: SHIPPED | OUTPATIENT
Start: 2021-10-07 | End: 2022-01-10

## 2021-10-19 ENCOUNTER — OFFICE VISIT (OUTPATIENT)
Dept: PRIMARY CARE CLINIC | Age: 61
End: 2021-10-19
Payer: COMMERCIAL

## 2021-10-19 VITALS
BODY MASS INDEX: 26.09 KG/M2 | DIASTOLIC BLOOD PRESSURE: 80 MMHG | TEMPERATURE: 97.9 F | HEIGHT: 64 IN | OXYGEN SATURATION: 97 % | SYSTOLIC BLOOD PRESSURE: 126 MMHG | HEART RATE: 85 BPM | WEIGHT: 152.8 LBS

## 2021-10-19 DIAGNOSIS — E78.2 COMBINED HYPERLIPIDEMIA: ICD-10-CM

## 2021-10-19 DIAGNOSIS — E03.9 ACQUIRED HYPOTHYROIDISM: ICD-10-CM

## 2021-10-19 DIAGNOSIS — I10 ESSENTIAL HYPERTENSION: Primary | ICD-10-CM

## 2021-10-19 PROCEDURE — 99214 OFFICE O/P EST MOD 30 MIN: CPT | Performed by: FAMILY MEDICINE

## 2021-10-19 RX ORDER — ATORVASTATIN CALCIUM 10 MG/1
10 TABLET, FILM COATED ORAL DAILY
Qty: 30 TABLET | Refills: 3 | Status: SHIPPED | OUTPATIENT
Start: 2021-10-19 | End: 2022-05-31

## 2021-10-19 ASSESSMENT — PATIENT HEALTH QUESTIONNAIRE - PHQ9
SUM OF ALL RESPONSES TO PHQ QUESTIONS 1-9: 0
2. FEELING DOWN, DEPRESSED OR HOPELESS: 0
SUM OF ALL RESPONSES TO PHQ9 QUESTIONS 1 & 2: 0
SUM OF ALL RESPONSES TO PHQ QUESTIONS 1-9: 0
1. LITTLE INTEREST OR PLEASURE IN DOING THINGS: 0
SUM OF ALL RESPONSES TO PHQ QUESTIONS 1-9: 0

## 2021-10-19 NOTE — PROGRESS NOTES
SUBJECTIVE:  Patient ID: Chencho Payne is a 63 y.o. y.o. female       Hypertension: Patient here for follow-up of elevated blood pressure. She is exercising and is adherent to low salt diet. Blood pressure is well controlled at home. Cardiac symptoms none. Patient denies chest pain, chest pressure/discomfort, dyspnea, exertional chest pressure/discomfort, fatigue, lower extremity edema, near-syncope, palpitations and syncope. Cardiovascular risk factors: hypertension. Use of agents associated with hypertension: none. History of target organ damage: none. The 10-year ASCVD risk score (Francisco Marie, et al., 2013) is: 3.7%    Values used to calculate the score:      Age: 63 years      Sex: Female      Is Non- : No      Diabetic: No      Tobacco smoker: No      Systolic Blood Pressure: 781 mmHg      Is BP treated: Yes      HDL Cholesterol: 73 mg/dL      Total Cholesterol: 242 mg/dL    Tolerating medicine well no concern,  Pt with hypothyroidism doing well on current med's,  .   Reviewed your last lab I do  recommend doing statin since she have strong family history her dad  of young age of heart attack  Past Medical History:   Diagnosis Date    Asthma     Capsulitis of shoulder 2012    left shoulder, Dr Narcisa Lorenzana Hypothyroid     IBS (irritable bowel syndrome) 2007    Nausea & vomiting     Raynaud's disease 2002    to feet      Past Surgical History:   Procedure Laterality Date   [facility] COLONOSCOPY      Dr Alex Roger ARTHROSCOPY Left 2014    LEFT KNEE ARTHROSCOPE WITH PARTIAL MEDIAL MENISCECTOMY, SYNOVECTOMY AND CHONDROPLASTY                                SHOULDER ARTHROSCOPY  2012    left shoulder arthroscopy with subacromial decompression , distal clavicle excision ,synovectomy,labral debridement    SHOULDER ARTHROSCOPY  10/16/2012    RIGHT SHOULDER ARTHROSCOPE, SUBACROMIAL DECOMPRESSION, DISTAL    UPPER GASTROINTESTINAL ENDOSCOPY 2007    Dr Davey Liang     Family History   Problem Relation Age of Onset    Diabetes Mother     High Blood Pressure Mother     High Blood Pressure Father     Heart Disease Father     High Blood Pressure Maternal Grandfather      Social History     Socioeconomic History    Marital status:      Spouse name: None    Number of children: None    Years of education: None    Highest education level: None   Occupational History    None   Tobacco Use    Smoking status: Never Smoker    Smokeless tobacco: Never Used   Substance and Sexual Activity    Alcohol use: No    Drug use: No    Sexual activity: None   Other Topics Concern    None   Social History Narrative    None     Social Determinants of Health     Financial Resource Strain: Low Risk     Difficulty of Paying Living Expenses: Not hard at all   Food Insecurity: No Food Insecurity    Worried About Running Out of Food in the Last Year: Never true    [place] of Food in the Last Year: Never true   Transportation Needs: No Transportation Needs    Lack of Transportation (Medical): No    Lack of Transportation (Non-Medical): No   Physical Activity: Sufficiently Active    Days of Exercise per Week: 6 days    Minutes of Exercise per Session: 40 min   Stress: No Stress Concern Present    Feeling of Stress : Not at all   Social Connections: Socially Integrated    Frequency of Communication with Friends and Family: More than three times a week    Frequency of Social Gatherings with Friends and Family: More than three times a week    Attends Worship Services: More than 4 times per year   [employer] [facility] [employer] or Organizations:  Yes    Attends Club or Organization Meetings: Never    Marital Status:    Intimate Partner Violence:     Fear of Current or Ex-Partner:     Emotionally Abused:     Physically Abused:     Sexually Abused:      Current Outpatient Medications   Medication Sig Dispense Refill    levothyroxine (SYNTHROID) 50 MCG tablet TAKE ONE TABLET BY MOUTH DAILY 90 tablet 0    lisinopril (PRINIVIL;ZESTRIL) 5 MG tablet Take 1 tablet by mouth daily 30 tablet 2    levalbuterol (XOPENEX HFA) 45 MCG/ACT inhaler Inhale 2 puffs into the lungs every 4 hours as needed for Wheezing 1 Inhaler 1    EPINEPHrine (EPIPEN 2-DEVAN) 0.3 MG/0.3ML JESS injection Inject 0.3 mLs into the muscle once for 1 dose. Use as directed for allergic reaction 2 Device 1     No current facility-administered medications for this visit. Allergies   Allergen Reactions    Latex Hives    Advil [Ibuprofen Micronized]     Aspirin     Demerol     Estrogens Conjugated      Joint pain      Hydrocodone Hives and Swelling    Macrobid [Nitrofurantoin Monohydrate Macrocrystals]     Prednisone     Vicodin [Hydrocodone-Acetaminophen]     Zithromax [Azithromycin Dihydrate]     Zyrtec [Cetirizine Hcl]     Iodine Rash       Review of Systems    OBJECTIVE:  /80 (Site: Right Upper Arm, Position: Sitting, Cuff Size: Small Adult)   Pulse 85   Temp 97.9 °F (36.6 °C) (Oral)   Ht 5' 4\" (1.626 m)   Wt 152 lb 12.8 oz (69.3 kg)   LMP 01/23/2014 (Approximate)   SpO2 97%   BMI 26.23 kg/m²     Physical Exam  Vitals reviewed. HENT:      Left Ear:  No middle ear effusion. Tympanic membrane is not erythematous. Eyes:      General: No scleral icterus. Conjunctiva/sclera: Conjunctivae normal.   Neck:      Thyroid: No thyromegaly. Vascular: No JVD. Cardiovascular:      Rate and Rhythm: Normal rate and regular rhythm. Heart sounds: Normal heart sounds. No murmur heard. No friction rub. No gallop. Pulmonary:      Effort: Pulmonary effort is normal.      Breath sounds: Normal breath sounds. No wheezing or rales. Abdominal:      General: Bowel sounds are normal. There is no distension. Palpations: Abdomen is soft. There is no mass. Tenderness: There is no abdominal tenderness. Hernia: No hernia is present.    Lymphadenopathy:      Cervical: No cervical adenopathy. Skin:     Findings: No rash. Neurological:      Mental Status: She is alert and oriented to person, place, and time. ASSESSMENT:  1. Essential hypertension  Assessment & Plan:   Well-controlled, continue current medications  2. Acquired hypothyroidism  Assessment & Plan:   Well-controlled, continue current medications  3. Combined hyperlipidemia  Assessment & Plan:   Borderline controlled, changes made today: start statin  Orders:  -     Basic Metabolic Panel; Future  -     Hepatic Function Panel; Future  -     Lipid Panel;  Future        PLAN:    See orders   Discussed recent lab with the patient  We will start low-dose of statin  Healthier eating exercise

## 2021-12-21 ENCOUNTER — OFFICE VISIT (OUTPATIENT)
Dept: PRIMARY CARE CLINIC | Age: 61
End: 2021-12-21
Payer: COMMERCIAL

## 2021-12-21 VITALS
RESPIRATION RATE: 12 BRPM | OXYGEN SATURATION: 97 % | BODY MASS INDEX: 25.78 KG/M2 | SYSTOLIC BLOOD PRESSURE: 136 MMHG | WEIGHT: 151 LBS | HEART RATE: 66 BPM | DIASTOLIC BLOOD PRESSURE: 70 MMHG | TEMPERATURE: 98.3 F | HEIGHT: 64 IN

## 2021-12-21 DIAGNOSIS — K58.9 IRRITABLE BOWEL SYNDROME, UNSPECIFIED TYPE: ICD-10-CM

## 2021-12-21 DIAGNOSIS — R10.9 ABDOMINAL PAIN, UNSPECIFIED ABDOMINAL LOCATION: Primary | ICD-10-CM

## 2021-12-21 LAB
BILIRUBIN, POC: NORMAL
BLOOD URINE, POC: NORMAL
CLARITY, POC: CLEAR
COLOR, POC: NORMAL
GLUCOSE URINE, POC: NORMAL
KETONES, POC: NORMAL
LEUKOCYTE EST, POC: NORMAL
NITRITE, POC: NORMAL
PH, POC: 5
PROTEIN, POC: NORMAL
SPECIFIC GRAVITY, POC: 1
UROBILINOGEN, POC: 0.2

## 2021-12-21 PROCEDURE — 81002 URINALYSIS NONAUTO W/O SCOPE: CPT | Performed by: FAMILY MEDICINE

## 2021-12-21 PROCEDURE — 99214 OFFICE O/P EST MOD 30 MIN: CPT | Performed by: FAMILY MEDICINE

## 2021-12-21 RX ORDER — DICYCLOMINE HYDROCHLORIDE 10 MG/1
10 CAPSULE ORAL 4 TIMES DAILY
Qty: 120 CAPSULE | Refills: 2 | Status: SHIPPED | OUTPATIENT
Start: 2021-12-21 | End: 2022-03-15

## 2021-12-21 RX ORDER — PANTOPRAZOLE SODIUM 40 MG/1
40 TABLET, DELAYED RELEASE ORAL
Qty: 30 TABLET | Refills: 2 | Status: SHIPPED | OUTPATIENT
Start: 2021-12-21

## 2021-12-21 ASSESSMENT — ENCOUNTER SYMPTOMS
CONSTIPATION: 0
RECTAL PAIN: 0
ABDOMINAL PAIN: 1
NAUSEA: 0
DIARRHEA: 0
ABDOMINAL DISTENTION: 0
EYES NEGATIVE: 1
RESPIRATORY NEGATIVE: 1
BLOOD IN STOOL: 0
VOMITING: 0
ANAL BLEEDING: 0

## 2021-12-21 NOTE — PROGRESS NOTES
SUBJECTIVE:  Patient ID: Briana Arevalo is a 63 y.o. y.o. female     HPI   Abdominal Pain: Patient complains of abdominal pain. The pain is described as colicky and cramping, and is 6/10 in intensity. Pain is located in the periumbilical bilateral without radiation. Onset was a few days ago. Symptoms have been unchanged since. Aggravating factors: stress. Alleviating factors: H2 blockers and tums. Associated symptoms: none. The patient denies arthralgias, belching, diarrhea, dysuria, flatus, headache, hematochezia, melena, nausea and vomiting.   With history IBS she feels her IBS is acting out she has multiple endoscopy colonoscopy according to patient  She is under a lot of stress and usually does a trigger      Past Medical History:   Diagnosis Date    Asthma     Capsulitis of shoulder Feb 2012    left shoulder, Dr Dominguez Leonard Hypothyroid     IBS (irritable bowel syndrome) 2007    Nausea & vomiting     Raynaud's disease 2002    to feet      Past Surgical History:   Procedure Laterality Date   [facility] COLONOSCOPY  2007    Dr Aggie Brown ARTHROSCOPY Left 2/4/2014    LEFT KNEE ARTHROSCOPE WITH PARTIAL MEDIAL MENISCECTOMY, SYNOVECTOMY AND CHONDROPLASTY                                SHOULDER ARTHROSCOPY  9-4-2012    left shoulder arthroscopy with subacromial decompression , distal clavicle excision ,synovectomy,labral debridement    SHOULDER ARTHROSCOPY  10/16/2012    RIGHT SHOULDER ARTHROSCOPE, SUBACROMIAL DECOMPRESSION, DISTAL    UPPER GASTROINTESTINAL ENDOSCOPY  2007    Dr Sylvia Hull     Family History   Problem Relation Age of Onset    Diabetes Mother     High Blood Pressure Mother     High Blood Pressure Father     Heart Disease Father     High Blood Pressure Maternal Grandfather      Social History     Socioeconomic History    Marital status:      Spouse name: None    Number of children: None    Years of education: None    Highest education level: None Occupational History    None   Tobacco Use    Smoking status: Never Smoker    Smokeless tobacco: Never Used   Substance and Sexual Activity    Alcohol use: No    Drug use: No    Sexual activity: None   Other Topics Concern    None   Social History Narrative    None     Social Determinants of Health     Financial Resource Strain: Low Risk     Difficulty of Paying Living Expenses: Not hard at all   Food Insecurity: No Food Insecurity    Worried About Running Out of Food in the Last Year: Never true    [place] of Food in the Last Year: Never true   Transportation Needs: No Transportation Needs    Lack of Transportation (Medical): No    Lack of Transportation (Non-Medical): No   Physical Activity: Sufficiently Active    Days of Exercise per Week: 6 days    Minutes of Exercise per Session: 40 min   Stress: No Stress Concern Present    Feeling of Stress : Not at all   Social Connections: Socially Integrated    Frequency of Communication with Friends and Family: More than three times a week    Frequency of Social Gatherings with Friends and Family: More than three times a week    Attends Yazidi Services: More than 4 times per year   [employer] [facility] or Organizations:  Yes    Attends Club or Organization Meetings: Never    Marital Status:    Intimate Partner Violence:     Fear of Current or Ex-Partner: Not on file    Emotionally Abused: Not on file    Physically Abused: Not on file    Sexually Abused: Not on file   Housing Stability: [address] Unable to Pay for Housing in the Last Year: No    Number of [place] in the Last Year: 1    Unstable Housing in the Last Year: No     Current Outpatient Medications   Medication Sig Dispense Refill    atorvastatin (LIPITOR) 10 MG tablet Take 1 tablet by mouth daily 30 tablet 3    levothyroxine (SYNTHROID) 50 MCG tablet TAKE ONE TABLET BY MOUTH DAILY 90 tablet 0    lisinopril (PRINIVIL;ZESTRIL) 5 MG tablet Take 1 tablet by mouth daily 30 tablet 2    levalbuterol (XOPENEX HFA) 45 MCG/ACT inhaler Inhale 2 puffs into the lungs every 4 hours as needed for Wheezing 1 Inhaler 1    EPINEPHrine (EPIPEN 2-DEVAN) 0.3 MG/0.3ML JESS injection Inject 0.3 mLs into the muscle once for 1 dose. Use as directed for allergic reaction 2 Device 1     No current facility-administered medications for this visit. Allergies   Allergen Reactions    Latex Hives    Advil [Ibuprofen Micronized]     Aspirin     Demerol     Estrogens Conjugated      Joint pain      Hydrocodone Hives and Swelling    Macrobid [Nitrofurantoin Monohydrate Macrocrystals]     Prednisone     Vicodin [Hydrocodone-Acetaminophen]     Zithromax [Azithromycin Dihydrate]     Zyrtec [Cetirizine Hcl]     Iodine Rash       Review of Systems   Constitutional: Negative. HENT: Negative. Eyes: Negative. Respiratory: Negative. Cardiovascular: Negative. Gastrointestinal: Positive for abdominal pain. Negative for abdominal distention, anal bleeding, blood in stool, constipation, diarrhea, nausea, rectal pain and vomiting. All other systems reviewed and are negative. OBJECTIVE:  /70 (Site: Right Upper Arm, Position: Sitting, Cuff Size: Small Adult)   Pulse 66   Temp 98.3 °F (36.8 °C) (Temporal)   Resp 12   Ht 5' 4\" (1.626 m)   Wt 151 lb (68.5 kg)   LMP 01/23/2014 (Approximate)   SpO2 97%   BMI 25.92 kg/m²     Physical Exam  Vitals reviewed. Eyes:      General: No scleral icterus. Conjunctiva/sclera: Conjunctivae normal.   Neck:      Thyroid: No thyromegaly. Vascular: No JVD. Cardiovascular:      Rate and Rhythm: Normal rate and regular rhythm. Heart sounds: Normal heart sounds. No murmur heard. No friction rub. No gallop. Pulmonary:      Effort: Pulmonary effort is normal.      Breath sounds: Normal breath sounds. No wheezing or rales. Abdominal:      General: Bowel sounds are normal. There is no distension.       Palpations: Abdomen is soft. There is no mass. Tenderness: There is abdominal tenderness. There is no right CVA tenderness, left CVA tenderness, guarding or rebound. Hernia: No hernia is present. Lymphadenopathy:      Cervical: No cervical adenopathy. Skin:     Findings: No rash. Neurological:      Mental Status: She is alert and oriented to person, place, and time. ASSESSMENT:   Diagnosis Orders   1. Abdominal pain, unspecified abdominal location  pantoprazole (PROTONIX) 40 MG tablet    POCT Urinalysis no Micro   2.  Irritable bowel syndrome, unspecified type  dicyclomine (BENTYL) 10 MG capsule       PLAN:  See orders   If not better may need to see GI

## 2021-12-22 ENCOUNTER — TELEPHONE (OUTPATIENT)
Dept: PRIMARY CARE CLINIC | Age: 61
End: 2021-12-22

## 2021-12-22 NOTE — TELEPHONE ENCOUNTER
[employer] calling in regards to pantoprazole (PROTONIX) 40 MG tablet [[id number]]       Patient has allergic reaction to crospovidone and wants to make sure it is ok to dispense

## 2021-12-23 ENCOUNTER — TELEPHONE (OUTPATIENT)
Dept: PRIMARY CARE CLINIC | Age: 61
End: 2021-12-23

## 2022-01-10 RX ORDER — LEVOTHYROXINE SODIUM 0.05 MG/1
TABLET ORAL
Qty: 90 TABLET | Refills: 0 | Status: SHIPPED | OUTPATIENT
Start: 2022-01-10 | End: 2022-04-14

## 2022-01-10 NOTE — TELEPHONE ENCOUNTER
Medication:   Requested Prescriptions     Pending Prescriptions Disp Refills    levothyroxine (SYNTHROID) 50 MCG tablet [Pharmacy Med Name: LEVOTHYROXINE 50 MCG TABLET] 90 tablet 0     Sig: TAKE ONE TABLET BY MOUTH DAILY     Last Filled:  10/7/21    Last appt: 12/21/2021   Next appt: 4/19/2022    Last Thyroid:   Lab Results   Component Value Date    TSH 2.85 09/14/2021    FT3 2.7 05/18/2015    Q8SSXRX 9.8 05/18/2015

## 2022-02-20 DIAGNOSIS — I10 ESSENTIAL HYPERTENSION: ICD-10-CM

## 2022-02-21 RX ORDER — LISINOPRIL 10 MG/1
TABLET ORAL
Qty: 180 TABLET | Refills: 0 | Status: SHIPPED | OUTPATIENT
Start: 2022-02-21 | End: 2022-07-14

## 2022-02-21 NOTE — TELEPHONE ENCOUNTER
Medication:   Requested Prescriptions     Pending Prescriptions Disp Refills    lisinopril (PRINIVIL;ZESTRIL) 10 MG tablet [Pharmacy Med Name: LISINOPRIL 10 MG TABLET] 180 tablet 0     Sig: TAKE ONE TABLET BY MOUTH TWICE A DAY       Last Filled:      Patient Phone Number: [phone] (home)     Last appt: 12/21/2021   Next appt: 4/19/2022    Last BMP:   Lab Results   Component Value Date     09/14/2021    K 5.3 09/14/2021     09/14/2021    CO2 25 09/14/2021    ANIONGAP 12 09/14/2021    GLUCOSE 104 09/14/2021    BUN 19 09/14/2021    CREATININE 0.8 09/14/2021    LABGLOM >60 09/14/2021    GFRAA >60 09/14/2021    GFRAA >60 08/23/2012    CALCIUM 10.1 09/14/2021      Last CMP:   Lab Results   Component Value Date     09/14/2021    K 5.3 09/14/2021     09/14/2021    CO2 25 09/14/2021    ANIONGAP 12 09/14/2021    GLUCOSE 104 09/14/2021    BUN 19 09/14/2021    CREATININE 0.8 09/14/2021    LABGLOM >60 09/14/2021    GFRAA >60 09/14/2021    GFRAA >60 08/23/2012    PROT 8.2 09/14/2021    LABALBU 4.5 09/14/2021    BILITOT 0.4 09/14/2021    ALKPHOS 92 09/14/2021    ALT 22 09/14/2021    AST 28 09/14/2021     Last Renal Function:   Lab Results   Component Value Date     09/14/2021    K 5.3 09/14/2021     09/14/2021    CO2 25 09/14/2021    GLUCOSE 104 09/14/2021    BUN 19 09/14/2021    CREATININE 0.8 09/14/2021    LABALBU 4.5 09/14/2021    CALCIUM 10.1 09/14/2021    GFR >60 08/23/2012    GFRAA >60 09/14/2021    GFRAA >60 08/23/2012       Last OARRS: No flowsheet data found. Preferred Pharmacy:   [facility] M[facility]za[address]  Phone: [phone] Fax: [phone]    Tomas Agee [address] [facility] [phone]  [address] [place] [address]  Phone: [phone] Fax: [phone]

## 2022-03-14 ENCOUNTER — NURSE TRIAGE (OUTPATIENT)
Dept: OTHER | Facility: CLINIC | Age: 62
End: 2022-03-14

## 2022-03-14 NOTE — TELEPHONE ENCOUNTER
Received call from [facility] at [facility] with Red Flag Complaint. Subjective: Caller states \"When I change positions I get dizzy. \" Pt denies current dizziness, CP, SOB, nausea, or vomiting. Current Symptoms: None    Onset: 2 weeks ago; intermittent    Associated Symptoms: NA    Pain Severity: Denies    Temperature: Denies   What has been tried: Denies    LMP: NA Pregnant: NA    Recommended disposition: See PCP within 3 Days    Care advice provided, patient verbalizes understanding; denies any other questions or concerns; instructed to call back for any new or worsening symptoms. Patient/Caller agrees with recommended disposition; writer provided warm transfer to Viktor Garg at [facility] for appointment scheduling     Attention Provider: Thank you for allowing me to participate in the care of your patient. The patient was connected to triage in response to information provided to the ECC/PSC. Please do not respond through this encounter as the response is not directed to a shared pool.       Reason for Disposition   MILD dizziness (e.g., walking normally) AND has NOT been evaluated by physician for this (Exception: dizziness caused by heat exposure, sudden standing, or poor fluid intake)    Protocols used: DIZZINESS-ADULT-[place]

## 2022-03-15 ENCOUNTER — TELEMEDICINE (OUTPATIENT)
Dept: PRIMARY CARE CLINIC | Age: 62
End: 2022-03-15
Payer: COMMERCIAL

## 2022-03-15 ENCOUNTER — TELEPHONE (OUTPATIENT)
Dept: PRIMARY CARE CLINIC | Age: 62
End: 2022-03-15

## 2022-03-15 DIAGNOSIS — R42 DIZZY SPELLS: Primary | ICD-10-CM

## 2022-03-15 PROCEDURE — 99213 OFFICE O/P EST LOW 20 MIN: CPT | Performed by: FAMILY MEDICINE

## 2022-03-15 RX ORDER — MECLIZINE HCL 12.5 MG/1
12.5 TABLET ORAL 3 TIMES DAILY PRN
Qty: 30 TABLET | Refills: 0 | Status: SHIPPED | OUTPATIENT
Start: 2022-03-15 | End: 2022-03-25

## 2022-03-15 ASSESSMENT — ENCOUNTER SYMPTOMS
GASTROINTESTINAL NEGATIVE: 1
RESPIRATORY NEGATIVE: 1
RHINORRHEA: 1
EYES NEGATIVE: 1

## 2022-03-15 NOTE — TELEPHONE ENCOUNTER
Within this Telehealth Consent, the terms you and yours refer to the person using the Telehealth Service (Service), or in the case of a use of the Service by or on behalf of a minor, you and yours refer to and include (i) the parent or legal guardian who provides consent to the use of the Service by such minor or uses the Service on behalf of such minor, and (ii) the minor for whom consent is being provided or on whose behalf the Service is being utilized. When using Service, you will be consulting with your health care providers via the use of Telehealth.   Telehealth involves the delivery of healthcare services using electronic communications, information technology or other means between a healthcare provider and a patient who are not in the same physical location. Telehealth may be used for diagnosis, treatment, follow-up and/or patient education, and may include, but is not limited to, one or more of the following:    Electronic transmission of medical records, photo images, personal health information or other data between a patient and a healthcare provider    Interactions between a patient and healthcare provider via audio, video and/or data communications    Use of output data from medical devices, sound and video files    Anticipated Benefits   The use of Telehealth by your Provider(s) through the Service may have the following possible benefits:    Making it easier and more efficient for you to access medical care and treatment for the conditions treated by such Provider(s) utilizing the Service    Allowing you to obtain medical care and treatment by Provider(s) at times that are convenient for you    Enabling you to interact with Provider(s) without the necessity of an in-office appointment     Possible Risks   While the use of Telehealth can provide potential benefits for you, there are also potential risks associated with the use of Telehealth.  These risks include, but may not be limited to the following:    Your Provider(s) may not able to provide medical treatment for your particular condition and you may be required to seek alternative healthcare or emergency care services.  The electronic systems or other security protocols or safeguards used in the Service could fail, causing a breach of privacy of your medical or other information.  Given regulatory requirements in certain jurisdictions, your Provider(s) diagnosis and/or treatment options, especially pertaining to certain prescriptions, may be limited. Acceptance   1. You understand that Services will be provided via Telehealth. This process involves the use of HIPAA compliant and secure, real-time audio-visual interfacing with a qualified and appropriately trained provider located at [facility]. 2. You understand that, under no circumstances, will this session be recorded. 3. You understand that the Provider(s) at [facility] and other clinical participants will be party to the information obtained during the Telehealth session in accordance with best medical practices. 4. You understand that the information obtained during the Telehealth session will be used to help determine the most appropriate treatment options. 5. You understand that You have the right to revoke this consent at any point in time. 6. You understand that Telehealth is voluntary, and that continued treatment is not dependent upon consent. 7. You understand that, in the event of non-consent to Telehealth services and/or technical difficulties, you will obtain services as typically provided in the absence of Telehealth technology. 8. You understand that this consent will be kept in Your medical record. 9. No potential benefits from the use of Telehealth or specific results can be guaranteed. Your condition may not be cured or improved and, in some cases, may get worse.    10. There are limitations in the provision of medical care and treatment via Telehealth and the Service and you may not be able to receive diagnosis and/or treatment through the Service for every condition for which you seek diagnosis and/or treatment. 11. There are potential risks to the use of Telehealth, including but not limited to the risks described in this Telehealth Consent. 12. Your Provider(s) have discussed the use of Telehealth and the Service with you, including the benefits and risks of such and you have provided oral consent to your Provider(s) for the use of Telehealth and the Service. 15. You understand that it is your duty to provide your Provider(s) truthful, accurate and complete information, including all relevant information regarding care that you may have received or may be receiving from other healthcare providers outside of the Service. 14. You understand that each of your Provider(s) may determine in his or sole discretion that your condition is not suitable for diagnosis and/or treatment using the Service, and that you may need to seek medical care and treatment a specialist or other healthcare provider, outside of the Service. 15. You understand that you are fully responsible for payment for all services provided by Provider(s) or through use of the Service and that you may not be able to use third-party insurance. 16. You represent that (a) you have read this Telehealth Consent carefully, (b) you understand the risks and benefits of the Service and the use of Telehealth in the medical care and treatment provided to you by Provider(s) using the Service, and (c) you have the legal capacity and authority to provide this consent for yourself and/or the minor for which you are consenting under applicable federal and state laws, including laws relating to the age of [de-identified] and/or parental/guardian consent.    17. You give your informed consent to the use of Telehealth by Provider(s) using the Service under the terms described in the Terms of Service and this Telehealth Consent. The patient was read the following statement and has consented to the visit as of 3/15/22. The patient has been scheduled for their first telehealth visit on 3/15/22 with Dr. Ramila Shea.

## 2022-03-15 NOTE — PROGRESS NOTES
3/15/2022    TELEHEALTH EVALUATION -- Audio/Visual (During QXDRS-81 public health emergency)    HPI:    Kaiser Masters (:  1960) has requested an audio/video evaluation for the following concern(s):    Vertigo - Dizziness: Patient presents with dizziness . The dizziness has been present for 3 weeks. The patient describes the symptoms as vertigo. Symptoms are exacerbated by rapid head movements rising from supine position The patient also complains of none. Patient denies no ear symptoms. She has been treated with nothing with NA improvement. Previous work up has been none  Pt was checked for COVID sometimes in .        Review of Systems   Constitutional: Negative. HENT: Positive for postnasal drip and rhinorrhea. Eyes: Negative. Respiratory: Negative. Cardiovascular: Negative. Gastrointestinal: Negative. Neurological: Positive for dizziness. Negative for tremors, seizures, syncope, facial asymmetry, speech difficulty, weakness, light-headedness, numbness and headaches. Prior to Visit Medications    Medication Sig Taking? Authorizing Provider   lisinopril (PRINIVIL;ZESTRIL) 10 MG tablet TAKE ONE TABLET BY MOUTH TWICE A DAY Yes Chela Caban MD   levothyroxine (SYNTHROID) 50 MCG tablet TAKE ONE TABLET BY MOUTH DAILY Yes Chela Caban MD   pantoprazole (PROTONIX) 40 MG tablet Take 1 tablet by mouth every morning (before breakfast) Yes Chela Caban MD   atorvastatin (LIPITOR) 10 MG tablet Take 1 tablet by mouth daily Yes Chela Caban MD   EPINEPHrine (EPIPEN 2-DEVAN) 0.3 MG/0.3ML JESS injection Inject 0.3 mLs into the muscle once for 1 dose.  Use as directed for allergic reaction  Roc Patterson III, MD       Social History     Tobacco Use    Smoking status: Never Smoker    Smokeless tobacco: Never Used   Substance Use Topics    Alcohol use: No    Drug use: No        Allergies   Allergen Reactions    Latex Hives    Advil [Ibuprofen Micronized]     Aspirin     Demerol     Estrogens Conjugated      Joint pain      Hydrocodone Hives and Swelling    Macrobid [Nitrofurantoin Monohydrate Macrocrystals]     Prednisone     Vicodin [Hydrocodone-Acetaminophen]     Zithromax [Azithromycin Dihydrate]     Zyrtec [Cetirizine Hcl]     Iodine Rash   ,   Past Medical History:   Diagnosis Date    Asthma     Capsulitis of shoulder 2012    left shoulder, Dr Heath Godinez Hypothyroid     IBS (irritable bowel syndrome) 2007    Nausea & vomiting     Raynaud's disease 2002    to feet   ,   Past Surgical History:   Procedure Laterality Date     SECTION      COLONOSCOPY      Dr Kylee Bennett ARTHROSCOPY Left 2014    LEFT KNEE ARTHROSCOPE WITH PARTIAL MEDIAL MENISCECTOMY, SYNOVECTOMY AND CHONDROPLASTY                                SHOULDER ARTHROSCOPY  2012    left shoulder arthroscopy with subacromial decompression , distal clavicle excision ,synovectomy,labral debridement    SHOULDER ARTHROSCOPY  10/16/2012    RIGHT SHOULDER ARTHROSCOPE, SUBACROMIAL DECOMPRESSION, DISTAL    UPPER GASTROINTESTINAL ENDOSCOPY      Dr Ahmet Bright   ,   Social History     Tobacco Use    Smoking status: Never Smoker    Smokeless tobacco: Never Used   Substance Use Topics    Alcohol use: No    Drug use: No   ,   Family History   Problem Relation Age of Onset    Diabetes Mother     High Blood Pressure Mother     High Blood Pressure Father     Heart Disease Father     High Blood Pressure Maternal Grandfather    ,   Immunization History   Administered Date(s) Administered    COVID-19, Pfizer Purple top, DILUTE for use, 12+ yrs, 30mcg/0.3mL dose 2021, 2021, 10/28/2021    Hepatitis A 2013    Hepatitis B 04/15/2013    Influenza Virus Vaccine 10/28/2011, 10/01/2013    Influenza, Intradermal, Preservative free 10/16/2015    Influenza, MDCK Quadv, IM, PF (Flucelvax 2 yrs and older) 2021    Influenza, Quadv, IM, PF (6 mo and older Fluzone, Flulaval, Fluarix, and 3 yrs and older Afluria) 09/26/2018, 10/03/2019    Influenza, Vineet Beckers, Recombinant, IM PF (Flublok 18 yrs and older) 10/20/2020    Tdap (Boostrix, Adacel) 12/30/2013    Zoster Recombinant (Shingrix) 10/28/2019, 01/08/2020   ,   Health Maintenance   Topic Date Due    Cervical cancer screen  12/19/2019    A1C test (Diabetic or Prediabetic)  09/14/2022    Lipid screen  09/14/2022    TSH testing  09/14/2022    Potassium monitoring  09/14/2022    Creatinine monitoring  09/14/2022    Depression Screen  10/19/2022    Breast cancer screen  06/01/2023    DTaP/Tdap/Td vaccine (2 - Td or Tdap) 12/30/2023    Colorectal Cancer Screen  09/07/2027    Flu vaccine  Completed    Shingles Vaccine  Completed    COVID-19 Vaccine  Completed    Hepatitis C screen  Completed    Hepatitis A vaccine  Aged Out    Hepatitis B vaccine  Aged Out    Hib vaccine  Aged Out    Meningococcal (ACWY) vaccine  Aged Out    Pneumococcal 0-64 years Vaccine  Aged Out    HIV screen  Discontinued       PHYSICAL EXAMINATION:  [ INSTRUCTIONS:  \"[x]\" Indicates a positive item  \"[]\" Indicates a negative item  -- DELETE ALL ITEMS NOT EXAMINED]  Vital Signs: (As obtained by patient/caregiver or practitioner observation)    Blood pressure-  Heart rate-    Respiratory rate-    Temperature-  Pulse oximetry-     Constitutional: [x] Appears well-developed and well-nourished [x] No apparent distress      [] Abnormal-   Mental status  [x] Alert and awake  [] Oriented to person/place/time []Able to follow commands      Eyes:  EOM    [x]  Normal  [] Abnormal-  Sclera  [x]  Normal  [] Abnormal -         Discharge []  None visible  [] Abnormal -    HENT:   [x] Normocephalic, atraumatic.   [] Abnormal   [] Mouth/Throat: Mucous membranes are moist.     External Ears [x] Normal  [] Abnormal-     Neck: [x] No visualized mass     Pulmonary/Chest: [x] Respiratory effort normal.  [] No visualized signs of difficulty breathing or respiratory distress        []

## 2022-03-17 ENCOUNTER — NURSE TRIAGE (OUTPATIENT)
Dept: OTHER | Facility: CLINIC | Age: 62
End: 2022-03-17

## 2022-03-17 ENCOUNTER — OFFICE VISIT (OUTPATIENT)
Dept: PRIMARY CARE CLINIC | Age: 62
End: 2022-03-17
Payer: COMMERCIAL

## 2022-03-17 VITALS
TEMPERATURE: 98.2 F | WEIGHT: 156.4 LBS | HEIGHT: 64 IN | DIASTOLIC BLOOD PRESSURE: 78 MMHG | BODY MASS INDEX: 26.7 KG/M2 | OXYGEN SATURATION: 98 % | HEART RATE: 73 BPM | SYSTOLIC BLOOD PRESSURE: 124 MMHG

## 2022-03-17 DIAGNOSIS — R20.2 NUMBNESS AND TINGLING IN LEFT ARM: Primary | ICD-10-CM

## 2022-03-17 DIAGNOSIS — R20.0 NUMBNESS AND TINGLING IN LEFT ARM: Primary | ICD-10-CM

## 2022-03-17 PROCEDURE — 99214 OFFICE O/P EST MOD 30 MIN: CPT | Performed by: FAMILY MEDICINE

## 2022-03-17 ASSESSMENT — ENCOUNTER SYMPTOMS
GASTROINTESTINAL NEGATIVE: 1
RESPIRATORY NEGATIVE: 1

## 2022-03-17 NOTE — TELEPHONE ENCOUNTER
Received call from Shiloh  at [facility] with Red Flag Complaint. Subjective: Caller states \" When Im laying down on my R side, my L shoulder to my finger tips become numb. I have to shift and lay on my L side. But last night even when I shifted my L side was still numb. I woke so many times last night. \"     Pt spoke to PCP a few days ago about vertigo, pt believes it is causing the numbness- taking her meds and dizziness is somewhat better. If laying down and going to raise is when she has the dizziness. Denies dizziness while speaking to writer. Current Symptoms: L shoulder pain, numbness from shoulder to hand and then tingling  in fingers     Onset: 2 weeks ago; worsening    Associated Symptoms: interfering with sleep     Pain Severity: 3/10; numbness, tingling, pins and needles; was intermittent but now constant     Temperature: denies       LMP: NA Pregnant: NA    Recommended disposition: Go to ED/UCC Now (Or to Office with PCP Approval)    Care advice provided, patient verbalizes understanding; denies any other questions or concerns; instructed to call back for any new or worsening symptoms. Writer provided warm transfer to Una Rivera  at [facility]  for further recommendation      Attention Provider: Thank you for allowing me to participate in the care of your patient. The patient was connected to triage in response to information provided to the ECC/PSC. Please do not respond through this encounter as the response is not directed to a shared pool.         Reason for Disposition   Neurologic deficit that was brief (now gone), ANY of the following: * Weakness of the face, arm, or leg on one side of the body* Numbness of the face, arm, or leg on one side of the body* Loss of speech or garbled speech    Protocols used: NEUROLOGIC DEFICIT-ADULT-[place]

## 2022-03-17 NOTE — PROGRESS NOTES
SUBJECTIVE:  Patient ID: Mary Thacker is a 63 y.o. y.o. female     HPI   Shoulder Pain: Patient complaints of left shoulder pain. This is evaluated as a personal injury. The pain is described as aching and tingling. The onset of the pain was gradual, starting about several weeks ago. The pain occurs when active and lasts 3 hours. Location is anterior, lateral. No history of dislocation. Symptoms are aggravated by no limitations. Symptoms are diminished by  rest.   Limited activities include: no limitations. No stiffness, no weakness, no swelling is reported. Patient is a cough  and she has not missed work  She is right handed.    she already surgery on the left shoulder due to OA/bone spur   Now tingling and numbness is worsen and different from prior the pain she had before her surgery     Past Medical History:   Diagnosis Date    Asthma     Capsulitis of shoulder Feb 2012    left shoulder, Dr Kodi Kwan Hypothyroid     IBS (irritable bowel syndrome) 2007    Nausea & vomiting     Raynaud's disease 2002    to feet      Past Surgical History:   Procedure Laterality Date   [facility] COLONOSCOPY  2007    Dr Darryn Davila ARTHROSCOPY Left 2/4/2014    LEFT KNEE ARTHROSCOPE WITH PARTIAL MEDIAL MENISCECTOMY, SYNOVECTOMY AND CHONDROPLASTY                                SHOULDER ARTHROSCOPY  9-4-2012    left shoulder arthroscopy with subacromial decompression , distal clavicle excision ,synovectomy,labral debridement    SHOULDER ARTHROSCOPY  10/16/2012    RIGHT SHOULDER ARTHROSCOPE, SUBACROMIAL DECOMPRESSION, DISTAL    UPPER GASTROINTESTINAL ENDOSCOPY  2007    Dr Shobha Chaudhry     Family History   Problem Relation Age of Onset    Diabetes Mother     High Blood Pressure Mother     High Blood Pressure Father     Heart Disease Father     High Blood Pressure Maternal Grandfather      Social History     Socioeconomic History    Marital status:      Spouse name: None    Number of children: None    Years of education: None    Highest education level: None   Occupational History    None   Tobacco Use    Smoking status: Never Smoker    Smokeless tobacco: Never Used   Substance and Sexual Activity    Alcohol use: No    Drug use: No    Sexual activity: None   Other Topics Concern    None   Social History Narrative    None     Social Determinants of Health     Financial Resource Strain: Low Risk     Difficulty of Paying Living Expenses: Not hard at all   Food Insecurity: No Food Insecurity    Worried About Running Out of Food in the Last Year: Never true    [place] of Food in the Last Year: Never true   Transportation Needs: No Transportation Needs    Lack of Transportation (Medical): No    Lack of Transportation (Non-Medical): No   Physical Activity: Sufficiently Active    Days of Exercise per Week: 6 days    Minutes of Exercise per Session: 40 min   Stress: No Stress Concern Present    Feeling of Stress : Not at all   Social Connections: Socially Integrated    Frequency of Communication with Friends and Family: More than three times a week    Frequency of Social Gatherings with Friends and Family: More than three times a week    Attends [employer]ist Services: More than 4 times per year   [employer] [facility] or Organizations:  Yes    Attends Club or Organization Meetings: Never    Marital Status:    Intimate Partner Violence:     Fear of Current or Ex-Partner: Not on file    Emotionally Abused: Not on file    Physically Abused: Not on file    Sexually Abused: Not on file   Housing Stability: [address] Unable to Pay for Housing in the Last Year: No    Number of [place] in the Last Year: 1    Unstable Housing in the Last Year: No     Current Outpatient Medications   Medication Sig Dispense Refill    meclizine (ANTIVERT) 12.5 MG tablet Take 1 tablet by mouth 3 times daily as needed for Dizziness 30 tablet 0    lisinopril (PRINIVIL;ZESTRIL) 10 MG tablet TAKE ONE TABLET BY MOUTH TWICE A  tablet 0    levothyroxine (SYNTHROID) 50 MCG tablet TAKE ONE TABLET BY MOUTH DAILY 90 tablet 0    pantoprazole (PROTONIX) 40 MG tablet Take 1 tablet by mouth every morning (before breakfast) 30 tablet 2    atorvastatin (LIPITOR) 10 MG tablet Take 1 tablet by mouth daily 30 tablet 3    EPINEPHrine (EPIPEN 2-DEVAN) 0.3 MG/0.3ML JESS injection Inject 0.3 mLs into the muscle once for 1 dose. Use as directed for allergic reaction 2 Device 1     No current facility-administered medications for this visit. Allergies   Allergen Reactions    Latex Hives    Advil [Ibuprofen Micronized]     Aspirin     Demerol     Estrogens Conjugated      Joint pain      Hydrocodone Hives and Swelling    Macrobid [Nitrofurantoin Monohydrate Macrocrystals]     Prednisone     Vicodin [Hydrocodone-Acetaminophen]     Zithromax [Azithromycin Dihydrate]     Zyrtec [Cetirizine Hcl]     Iodine Rash       Review of Systems   Constitutional: Negative. HENT: Negative. Respiratory: Negative. Cardiovascular: Negative. Gastrointestinal: Negative. Neurological: Positive for numbness. All other systems reviewed and are negative. OBJECTIVE:  /78 (Site: Right Upper Arm, Position: Sitting, Cuff Size: Medium Adult)   Pulse 73   Temp 98.2 °F (36.8 °C) (Temporal)   Ht 5' 4\" (1.626 m)   Wt 156 lb 6.4 oz (70.9 kg)   LMP 01/23/2014 (Approximate)   SpO2 98%   BMI 26.85 kg/m²     Physical Exam  Vitals reviewed. Musculoskeletal:      Left shoulder: Tenderness present. No swelling, deformity, effusion, laceration, bony tenderness or crepitus. Normal range of motion. Normal strength. Normal pulse. ASSESSMENT:     Diagnosis Orders   1. Numbness and tingling in left arm  EMG       PLAN:  See orders   Pt wants to wait for the test result for possible treatment.

## 2022-04-13 NOTE — TELEPHONE ENCOUNTER
Medication:   Requested Prescriptions     Pending Prescriptions Disp Refills    levothyroxine (SYNTHROID) 50 MCG tablet [Pharmacy Med Name: LEVOTHYROXINE 50 MCG TABLET] 90 tablet 0     Sig: TAKE ONE TABLET BY MOUTH DAILY       Last Filled:      Patient Phone Number: [phone] (home)     Last appt: 3/17/2022   Next appt: 4/19/2022    Last Thyroid:   Lab Results   Component Value Date    TSH 2.85 09/14/2021    FT3 2.7 05/18/2015    H6IETGC 9.8 05/18/2015       Last OARRS: No flowsheet data found. Preferred Pharmacy:   [facility] [id number] - [address]  [address]  Phone: [phone] Fax: [phone]    [facility] [address] [facility] [phone]  [address] [place] [address]  Phone: [phone] Fax: [phone]

## 2022-04-14 RX ORDER — LEVOTHYROXINE SODIUM 0.05 MG/1
TABLET ORAL
Qty: 90 TABLET | Refills: 0 | Status: SHIPPED | OUTPATIENT
Start: 2022-04-14 | End: 2022-07-11

## 2022-04-19 ENCOUNTER — HOSPITAL ENCOUNTER (OUTPATIENT)
Dept: NEUROLOGY | Age: 62
Discharge: HOME | End: 2022-04-19
Payer: COMMERCIAL

## 2022-04-19 DIAGNOSIS — R20.0 NUMBNESS AND TINGLING IN LEFT ARM: ICD-10-CM

## 2022-04-19 DIAGNOSIS — R20.2 NUMBNESS AND TINGLING IN LEFT ARM: ICD-10-CM

## 2022-04-19 DIAGNOSIS — R20.2 NUMBNESS AND TINGLING IN LEFT ARM: Primary | ICD-10-CM

## 2022-04-19 DIAGNOSIS — R20.0 NUMBNESS AND TINGLING IN LEFT ARM: Primary | ICD-10-CM

## 2022-04-19 PROCEDURE — 95908 NRV CNDJ TST 3-4 STUDIES: CPT

## 2022-04-19 PROCEDURE — 95886 MUSC TEST DONE W/N TEST COMP: CPT

## 2022-04-19 NOTE — PROCEDURES
Test Date:  2022    Patient: Ignacio Albright : 1960 Physician: Matteo Curry DO   Sex: Female ID#:  Ref Phys: Driss Stock MD     Patient Complaints:  Patient is a 63year-old female who presents with pain numbness in the left hand no neck pain, numbness through out  arm onset months ago. Patient History / Exam:  + hypothyroidism, + htn, + prediabetes, + asthma, + arthritis. + hypothyroidism, + bilateral shoulder surgery PE: reflexes trace, + left hand intrinsic weakness     NCV & EMG Findings:  Evaluation of the left ulnar (ADM) motor nerve showed decreased conduction velocity (Abv Elbow-Bel Elbow, 47 m/s). The left ulnar sensory nerve showed prolonged distal peak latency (3.8 ms) and decreased conduction velocity (37 m/s). All remaining nerves (as indicated in the following tables) were within normal limits. All examined muscles (as indicated in the following table) showed no evidence of electrical instability. 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  no evidence of an acute radiculopathy or other entrapment neuropathy          Matteo Curry DO        Nerve Conduction Studies  Motor Nerve Results      Latency Amplitude F-Lat Segment Distance CV Comment   Site (ms) Norm (mV) Norm (ms)  (cm) (m/s) Norm    Left Median (APB) Motor   Wrist 4.1  < 4.2 6.2  > 5.0         Elbow 7.7 - 1.06 -  Elbow-Wrist 20 56  > 50    Left Ulnar (ADM) Motor   Wrist 3.8  < 4.2 7.4  > 3.0         Bel Elbow 7.2 - 7.0 -  Bel Elbow-Wrist 23 68  > 50    Abv Elbow 8.7 - 5.8 -  Abv Elbow-Bel Elbow 7 47  > 48      Sensory Nerve Results      Latency (Peak) Amplitude (P-P) Segment Distance CV Comment   Site (ms) Norm (µV) Norm  (cm) (m/s) Norm    Left Median Sensory   Wrist-Dig II 3.5  < 3.6 101  > 10 Wrist-Dig II 14 40  > 39    Left Ulnar Sensory   Wrist-Dig V 3.8  < 3.7 21  > 15 Wrist-Dig V 14 37  > 38        Electromyography     Side Muscle Nerve Root Ins Act Fibs Psw Amp Dur Poly Recrt Int Zeenat Nam Comment   Left Deltoid Axillary C5-C6 Nml Nml Nml Nml Nml 0 Nml Nml    Left Biceps Musculocut C5-C6 Nml Nml Nml Nml Nml 0 Nml Nml    Left Triceps Radial C6-C8 Nml Nml Nml Nml Nml 0 Nml Nml    Left Brachiorad Radial C5-C6 Nml Nml Nml Nml Nml 0 Nml Nml    Left Pronator Teres Median C6-C7 Nml Nml Nml Nml Nml 0 Nml Nml    Left EIP Post Interosseous,  R... C7-C8 Nml Nml Nml Nml Nml 0 Nml Nml    Left APB Median C8-T1 Nml Nml Nml Nml Nml 0 Nml Nml    Left FDI Ulnar C8-T1 Nml Nml Nml Nml Nml 0 Nml Nml    Left Cervical Paraspinal (Uppe. .. Rami C1-C3 Nml Nml Nml         Left Cervical Paraspinal (Mid) Rami C4-C6 Nml Nml Nml         Left Cervical Paraspinal (Bel [place]. ..  Rami C7-C8 Nml Nml Nml               Electronically signed by Renate Hassan DO on 4/19/2022 at 12:30 PM

## 2022-04-20 ENCOUNTER — TELEPHONE (OUTPATIENT)
Dept: PRIMARY CARE CLINIC | Age: 62
End: 2022-04-20

## 2022-04-20 NOTE — TELEPHONE ENCOUNTER
----- Message from Aquilino Linton sent at 4/19/2022  6:25 PM EDT -----  Subject: Message to Provider    QUESTIONS  Information for Provider? Pt attempted to return a call. Unsure of what it   may have been about but she states that she is in recovery from her   surgery now and would like a call back. please advise.   ---------------------------------------------------------------------------  --------------  CALL BACK INFO  What is the best way for the office to contact you? OK to leave message on   voicemail  Preferred Call Back Phone Number? [phone]  ---------------------------------------------------------------------------  --------------  SCRIPT ANSWERS  Relationship to Patient?  Self

## 2022-05-31 RX ORDER — ATORVASTATIN CALCIUM 10 MG/1
TABLET, FILM COATED ORAL
Qty: 30 TABLET | Refills: 3 | Status: SHIPPED | OUTPATIENT
Start: 2022-05-31 | End: 2022-10-18 | Stop reason: SDUPTHER

## 2022-05-31 NOTE — TELEPHONE ENCOUNTER
Medication:   Requested Prescriptions     Pending Prescriptions Disp Refills    atorvastatin (LIPITOR) 10 MG tablet [Pharmacy Med Name: ATORVASTATIN 10 MG TABLET] 30 tablet 3     Sig: TAKE ONE TABLET BY MOUTH DAILY       Last Filled:  10/19/21    Patient Phone Number: [phone] (home)     Last appt: 3/17/2022   Next appt: Visit date not found    Last Lipid:   Lab Results   Component Value Date    CHOL 242 09/14/2021    TRIG 139 09/14/2021    HDL 73 09/14/2021    [address] 141 09/14/2021       Last OARRS: No flowsheet data found. Preferred Pharmacy:   [place] [id number] - [address]  [address]  Phone: [phone] Fax: [phone]    [facility], [address] [facility] [phone]  [address] [place] [address]  Phone: [phone] Fax: [phone]

## 2022-07-11 RX ORDER — LEVOTHYROXINE SODIUM 0.05 MG/1
TABLET ORAL
Qty: 90 TABLET | Refills: 0 | Status: SHIPPED | OUTPATIENT
Start: 2022-07-11 | End: 2022-10-13

## 2022-07-13 DIAGNOSIS — I10 ESSENTIAL HYPERTENSION: ICD-10-CM

## 2022-07-13 NOTE — TELEPHONE ENCOUNTER
Medication:   Requested Prescriptions     Pending Prescriptions Disp Refills    lisinopril (PRINIVIL;ZESTRIL) 10 MG tablet [Pharmacy Med Name: LISINOPRIL 10 MG TABLET] 180 tablet 0     Sig: TAKE ONE TABLET BY MOUTH TWICE A DAY       Last Filled:      Patient Phone Number: [phone] (home)     Last appt: 3/17/2022   Next appt: Visit date not found    Last BMP:   Lab Results   Component Value Date/Time     09/14/2021 08:38 AM    K 5.3 09/14/2021 08:38 AM     09/14/2021 08:38 AM    CO2 25 09/14/2021 08:38 AM    ANIONGAP 12 09/14/2021 08:38 AM    GLUCOSE 104 09/14/2021 08:38 AM    BUN 19 09/14/2021 08:38 AM    CREATININE 0.8 09/14/2021 08:38 AM    LABGLOM >60 09/14/2021 08:38 AM    GFRAA >60 09/14/2021 08:38 AM    GFRAA >60 08/23/2012 03:41 PM    CALCIUM 10.1 09/14/2021 08:38 AM      Last CMP:   Lab Results   Component Value Date/Time     09/14/2021 08:38 AM    K 5.3 09/14/2021 08:38 AM     09/14/2021 08:38 AM    CO2 25 09/14/2021 08:38 AM    ANIONGAP 12 09/14/2021 08:38 AM    GLUCOSE 104 09/14/2021 08:38 AM    BUN 19 09/14/2021 08:38 AM    CREATININE 0.8 09/14/2021 08:38 AM    LABGLOM >60 09/14/2021 08:38 AM    GFRAA >60 09/14/2021 08:38 AM    GFRAA >60 08/23/2012 03:41 PM    PROT 8.2 09/14/2021 08:38 AM    LABALBU 4.5 09/14/2021 08:38 AM    BILITOT 0.4 09/14/2021 08:38 AM    ALKPHOS 92 09/14/2021 08:38 AM    ALT 22 09/14/2021 08:38 AM    AST 28 09/14/2021 08:38 AM     Last Renal Function:   Lab Results   Component Value Date/Time     09/14/2021 08:38 AM    K 5.3 09/14/2021 08:38 AM     09/14/2021 08:38 AM    CO2 25 09/14/2021 08:38 AM    GLUCOSE 104 09/14/2021 08:38 AM    BUN 19 09/14/2021 08:38 AM    CREATININE 0.8 09/14/2021 08:38 AM    LABALBU 4.5 09/14/2021 08:38 AM    CALCIUM 10.1 09/14/2021 08:38 AM    GFR >60 08/23/2012 03:41 PM    GFRAA >60 09/14/2021 08:38 AM    GFRAA >60 08/23/2012 03:41 PM       Last OARRS: No flowsheet data found.     Preferred Pharmacy:   [facility]

## 2022-07-14 RX ORDER — LISINOPRIL 10 MG/1
TABLET ORAL
Qty: 180 TABLET | Refills: 0 | Status: SHIPPED | OUTPATIENT
Start: 2022-07-14

## 2022-07-15 ENCOUNTER — TELEPHONE (OUTPATIENT)
Dept: PRIMARY CARE CLINIC | Age: 62
End: 2022-07-15

## 2022-07-15 NOTE — TELEPHONE ENCOUNTER
Pt tested positive for Covid yesterday by an at home test. Pt is wanting advice as to what to do going forward? Does she need any medicine?   Pls call pt to discuss

## 2022-07-18 NOTE — TELEPHONE ENCOUNTER
Pt called back and currently has fatigue, sore throat, cough, and runny nose. Tested positive on Thursday.

## 2022-09-12 RX ORDER — AZELASTINE 1 MG/ML
SPRAY, METERED NASAL
Qty: 30 ML | Refills: 1 | Status: SHIPPED | OUTPATIENT
Start: 2022-09-12

## 2022-09-12 NOTE — TELEPHONE ENCOUNTER
Medication:   Requested Prescriptions     Pending Prescriptions Disp Refills    azelastine (ASTELIN) 0.1 % nasal spray [Pharmacy Med Name: AZELASTINE 0.1% (137 MCG) SPRY] 30 mL      Sig: SPRAY TWO SPRAYS IN EACH NOSTRIL TWICE DAILY     Last Filled:  9/7/21    Last appt: 3/17/2022   Next appt: Visit date not found

## 2022-10-11 NOTE — TELEPHONE ENCOUNTER
Medication:   Requested Prescriptions     Pending Prescriptions Disp Refills    levothyroxine (SYNTHROID) 50 MCG tablet [Pharmacy Med Name: LEVOTHYROXINE 50 MCG TABLET] 90 tablet 0     Sig: TAKE ONE TABLET BY MOUTH DAILY     Last Filled:  7/11/22    Last appt: 3/17/2022   Next appt: Sent [place] message to contact office to schedule an appointment    Last Thyroid:   Lab Results   Component Value Date/Time    TSH 2.85 09/14/2021 08:38 AM    FT3 2.7 05/18/2015 12:12 PM    V5UWWDE 9.8 05/18/2015 12:12 PM

## 2022-10-13 RX ORDER — LEVOTHYROXINE SODIUM 0.05 MG/1
TABLET ORAL
Qty: 90 TABLET | Refills: 0 | Status: SHIPPED | OUTPATIENT
Start: 2022-10-13

## 2022-10-18 ENCOUNTER — OFFICE VISIT (OUTPATIENT)
Dept: PRIMARY CARE CLINIC | Age: 62
End: 2022-10-18
Payer: COMMERCIAL

## 2022-10-18 VITALS
HEART RATE: 85 BPM | OXYGEN SATURATION: 99 % | WEIGHT: 158.4 LBS | BODY MASS INDEX: 27.19 KG/M2 | SYSTOLIC BLOOD PRESSURE: 122 MMHG | DIASTOLIC BLOOD PRESSURE: 86 MMHG | TEMPERATURE: 97.3 F

## 2022-10-18 DIAGNOSIS — E78.2 COMBINED HYPERLIPIDEMIA: ICD-10-CM

## 2022-10-18 DIAGNOSIS — I10 ESSENTIAL HYPERTENSION: Primary | ICD-10-CM

## 2022-10-18 DIAGNOSIS — E03.9 ACQUIRED HYPOTHYROIDISM: ICD-10-CM

## 2022-10-18 DIAGNOSIS — R73.9 HYPERGLYCEMIA: ICD-10-CM

## 2022-10-18 PROCEDURE — 99214 OFFICE O/P EST MOD 30 MIN: CPT | Performed by: FAMILY MEDICINE

## 2022-10-18 RX ORDER — ATORVASTATIN CALCIUM 10 MG/1
10 TABLET, FILM COATED ORAL DAILY
Qty: 30 TABLET | Refills: 5 | Status: SHIPPED | OUTPATIENT
Start: 2022-10-18

## 2022-10-18 SDOH — ECONOMIC STABILITY: FOOD INSECURITY: WITHIN THE PAST 12 MONTHS, THE FOOD YOU BOUGHT JUST DIDN'T LAST AND YOU DIDN'T HAVE MONEY TO GET MORE.: NEVER TRUE

## 2022-10-18 SDOH — ECONOMIC STABILITY: FOOD INSECURITY: WITHIN THE PAST 12 MONTHS, YOU WORRIED THAT YOUR FOOD WOULD RUN OUT BEFORE YOU GOT MONEY TO BUY MORE.: NEVER TRUE

## 2022-10-18 ASSESSMENT — ENCOUNTER SYMPTOMS
RESPIRATORY NEGATIVE: 1
EYES NEGATIVE: 1
GASTROINTESTINAL NEGATIVE: 1

## 2022-10-18 ASSESSMENT — PATIENT HEALTH QUESTIONNAIRE - PHQ9
2. FEELING DOWN, DEPRESSED OR HOPELESS: 0
SUM OF ALL RESPONSES TO PHQ QUESTIONS 1-9: 0
SUM OF ALL RESPONSES TO PHQ QUESTIONS 1-9: 0
1. LITTLE INTEREST OR PLEASURE IN DOING THINGS: 0
SUM OF ALL RESPONSES TO PHQ9 QUESTIONS 1 & 2: 0
SUM OF ALL RESPONSES TO PHQ QUESTIONS 1-9: 0
SUM OF ALL RESPONSES TO PHQ QUESTIONS 1-9: 0

## 2022-10-18 ASSESSMENT — SOCIAL DETERMINANTS OF HEALTH (SDOH): HOW HARD IS IT FOR YOU TO PAY FOR THE VERY BASICS LIKE FOOD, HOUSING, MEDICAL CARE, AND HEATING?: NOT HARD AT ALL

## 2022-10-18 NOTE — PROGRESS NOTES
SUBJECTIVE:  Patient ID: Priyanka Purcell is a 63 y.o. y.o. female     Arm Pain     Hypertension     Hypertension: Patient here for follow-up of elevated blood pressure. She is exercising and is adherent to low salt diet. Blood pressure is well controlled at home. Cardiac symptoms none. Patient denies chest pain, chest pressure/discomfort, dyspnea, exertional chest pressure/discomfort, fatigue, lower extremity edema, near-syncope, palpitations and syncope. Cardiovascular risk factors: hypertension. Use of agents associated with hypertension: none. History of target organ damage: none.     Tolerating medicine well no concern    Patient with hypothyroidism stable on current medication due to her blood work she will be back to do it  Hyperglycemia she will be back to recheck fasting blood glucose  Hyperlipidemia on statin doing okay we will recheck fasting blood work  Past Medical History:   Diagnosis Date    Asthma     Capsulitis of shoulder Feb 2012    left shoulder, Dr Lauren Gilmore    Hypothyroid     IBS (irritable bowel syndrome) 2007    Nausea & vomiting     Raynaud's disease 2002    to feet      Past Surgical History:   Procedure Laterality Date    [address]  2007    Dr Alvin Bartlett ARTHROSCOPY Left 2/4/2014    LEFT KNEE ARTHROSCOPE WITH PARTIAL MEDIAL MENISCECTOMY, SYNOVECTOMY AND CHONDROPLASTY                                SHOULDER ARTHROSCOPY  9-4-2012    left shoulder arthroscopy with subacromial decompression , distal clavicle excision ,synovectomy,labral debridement    SHOULDER ARTHROSCOPY  10/16/2012    RIGHT SHOULDER ARTHROSCOPE, SUBACROMIAL DECOMPRESSION, DISTAL    UPPER GASTROINTESTINAL ENDOSCOPY  2007    Dr Daryl Greco     Family History   Problem Relation Age of Onset    Diabetes Mother     High Blood Pressure Mother     High Blood Pressure Father     Heart Disease Father     High Blood Pressure Maternal Grandfather      Social History     Socioeconomic History    Marital status:    Tobacco Use    Smoking status: Never    Smokeless tobacco: Never   Substance and Sexual Activity    Alcohol use: No    Drug use: No     Social Determinants of Health     Financial Resource Strain: Low Risk     Difficulty of Paying Living Expenses: Not hard at all   Food Insecurity: No Food Insecurity    Worried About Running Out of Food in the Last Year: Never true    Ran Out of Food in the Last Year: Never true     Current Outpatient Medications   Medication Sig Dispense Refill    levothyroxine (SYNTHROID) 50 MCG tablet TAKE ONE TABLET BY MOUTH DAILY 90 tablet 0    azelastine (ASTELIN) 0.1 % nasal spray SPRAY TWO SPRAYS IN EACH NOSTRIL TWICE DAILY 30 mL 1    lisinopril (PRINIVIL;ZESTRIL) 10 MG tablet TAKE ONE TABLET BY MOUTH TWICE A  tablet 0    atorvastatin (LIPITOR) 10 MG tablet TAKE ONE TABLET BY MOUTH DAILY 30 tablet 3    pantoprazole (PROTONIX) 40 MG tablet Take 1 tablet by mouth every morning (before breakfast) 30 tablet 2    EPINEPHrine (EPIPEN 2-DEVAN) 0.3 MG/0.3ML JESS injection Inject 0.3 mLs into the muscle once for 1 dose. Use as directed for allergic reaction 2 Device 1     No current facility-administered medications for this visit. Allergies   Allergen Reactions    Latex Hives    Advil [Ibuprofen Micronized]     Aspirin     Demerol     Estrogens Conjugated      Joint pain      Hydrocodone Hives and Swelling    Macrobid [Nitrofurantoin Monohydrate Macrocrystals]     Prednisone     Vicodin [Hydrocodone-Acetaminophen]     Zithromax [Azithromycin Dihydrate]     Zyrtec [Cetirizine Hcl]     Iodine Rash       Review of Systems   Constitutional: Negative. HENT: Negative. Eyes: Negative. Respiratory: Negative. Cardiovascular: Negative. Gastrointestinal: Negative. All other systems reviewed and are negative.     OBJECTIVE:  /86 (Site: Right Upper Arm, Position: Sitting, Cuff Size: Small Adult)   Pulse 85   Temp 97.3 °F (36.3 °C)   Wt 158 lb 6.4 oz (71.8 kg)   LMP 01/23/2014 (Approximate)   SpO2 99%   BMI 27.19 kg/m²     Physical Exam  Vitals reviewed. HENT:      Left Ear:  No middle ear effusion. Tympanic membrane is not erythematous. Eyes:      General: No scleral icterus. Conjunctiva/sclera: Conjunctivae normal.   Neck:      Thyroid: No thyromegaly. Vascular: No JVD. Cardiovascular:      Rate and Rhythm: Normal rate and regular rhythm. Heart sounds: Normal heart sounds. No murmur heard. No friction rub. No gallop. Pulmonary:      Effort: Pulmonary effort is normal.      Breath sounds: Normal breath sounds. No wheezing or rales. Abdominal:      General: Bowel sounds are normal. There is no distension. Palpations: Abdomen is soft. There is no mass. Tenderness: There is no abdominal tenderness. Hernia: No hernia is present. Lymphadenopathy:      Cervical: No cervical adenopathy. Skin:     Findings: No rash. Neurological:      Mental Status: She is alert and oriented to person, place, and time. ASSESSMENT:   Diagnosis Orders   1. Essential hypertension  Basic Metabolic Panel    CBC    Lipid Panel    Hepatic Function Panel    Hemoglobin A1C    TSH      2. Acquired hypothyroidism  TSH      3. Hyperglycemia  Basic Metabolic Panel    Hemoglobin A1C      4.  Combined hyperlipidemia                PLAN:    See orders   Continue the same

## 2022-10-19 DIAGNOSIS — I10 ESSENTIAL HYPERTENSION: ICD-10-CM

## 2022-10-19 DIAGNOSIS — R73.9 HYPERGLYCEMIA: ICD-10-CM

## 2022-10-19 DIAGNOSIS — E03.9 ACQUIRED HYPOTHYROIDISM: ICD-10-CM

## 2022-10-19 LAB
ALBUMIN SERPL-MCNC: 4.1 G/DL (ref 3.4–5)
ALP BLD-CCNC: 83 U/L (ref 40–129)
ALT SERPL-CCNC: 18 U/L (ref 10–40)
ANION GAP SERPL CALCULATED.3IONS-SCNC: 12 MMOL/L (ref 3–16)
AST SERPL-CCNC: 24 U/L (ref 15–37)
BILIRUB SERPL-MCNC: 0.6 MG/DL (ref 0–1)
BILIRUBIN DIRECT: <0.2 MG/DL (ref 0–0.3)
BILIRUBIN, INDIRECT: NORMAL MG/DL (ref 0–1)
BUN BLDV-MCNC: 16 MG/DL (ref 7–20)
CALCIUM SERPL-MCNC: 9.7 MG/DL (ref 8.3–10.6)
CHLORIDE BLD-SCNC: 100 MMOL/L (ref 99–110)
CHOLESTEROL, TOTAL: 209 MG/DL (ref 0–199)
CO2: 25 MMOL/L (ref 21–32)
CREAT SERPL-MCNC: 0.9 MG/DL (ref 0.6–1.2)
GFR SERPL CREATININE-BSD FRML MDRD: >60 ML/MIN/{1.73_M2}
GLUCOSE BLD-MCNC: 102 MG/DL (ref 70–99)
HCT VFR BLD CALC: 37.7 % (ref 36–48)
HDLC SERPL-MCNC: 79 MG/DL (ref 40–60)
HEMOGLOBIN: 12.1 G/DL (ref 12–16)
LDL CHOLESTEROL CALCULATED: 109 MG/DL
MCH RBC QN AUTO: 26.4 PG (ref 26–34)
MCHC RBC AUTO-ENTMCNC: 32.1 G/DL (ref 31–36)
MCV RBC AUTO: 82.2 FL (ref 80–100)
PDW BLD-RTO: 14.2 % (ref 12.4–15.4)
PLATELET # BLD: 188 K/UL (ref 135–450)
PMV BLD AUTO: 9.8 FL (ref 5–10.5)
POTASSIUM SERPL-SCNC: 4.4 MMOL/L (ref 3.5–5.1)
RBC # BLD: 4.58 M/UL (ref 4–5.2)
SODIUM BLD-SCNC: 137 MMOL/L (ref 136–145)
TOTAL PROTEIN: 7.5 G/DL (ref 6.4–8.2)
TRIGL SERPL-MCNC: 104 MG/DL (ref 0–150)
TSH SERPL DL<=0.05 MIU/L-ACNC: 3.86 UIU/ML (ref 0.27–4.2)
VLDLC SERPL CALC-MCNC: 21 MG/DL
WBC # BLD: 5.4 K/UL (ref 4–11)

## 2022-10-20 LAB
ESTIMATED AVERAGE GLUCOSE: 134.1 MG/DL
HBA1C MFR BLD: 6.3 %

## 2022-11-08 ENCOUNTER — OFFICE VISIT (OUTPATIENT)
Dept: PRIMARY CARE CLINIC | Age: 62
End: 2022-11-08
Payer: COMMERCIAL

## 2022-11-08 VITALS
HEART RATE: 61 BPM | WEIGHT: 156.4 LBS | DIASTOLIC BLOOD PRESSURE: 60 MMHG | BODY MASS INDEX: 26.85 KG/M2 | OXYGEN SATURATION: 98 % | TEMPERATURE: 97.3 F | SYSTOLIC BLOOD PRESSURE: 118 MMHG

## 2022-11-08 DIAGNOSIS — N89.8 VAGINAL DISCHARGE: ICD-10-CM

## 2022-11-08 DIAGNOSIS — Z01.419 ENCOUNTER FOR GYNECOLOGICAL EXAMINATION WITHOUT ABNORMAL FINDING: Primary | ICD-10-CM

## 2022-11-08 LAB
BILIRUBIN, POC: NORMAL
BLOOD URINE, POC: NORMAL
CLARITY, POC: CLEAR
COLOR, POC: NORMAL
GLUCOSE URINE, POC: NORMAL
KETONES, POC: NORMAL
LEUKOCYTE EST, POC: NORMAL
NITRITE, POC: NORMAL
PH, POC: 6
PROTEIN, POC: NORMAL
SPECIFIC GRAVITY, POC: 1.02
UROBILINOGEN, POC: 0.2

## 2022-11-08 PROCEDURE — 81002 URINALYSIS NONAUTO W/O SCOPE: CPT | Performed by: FAMILY MEDICINE

## 2022-11-08 PROCEDURE — 3078F DIAST BP <80 MM HG: CPT | Performed by: FAMILY MEDICINE

## 2022-11-08 PROCEDURE — 99396 PREV VISIT EST AGE 40-64: CPT | Performed by: FAMILY MEDICINE

## 2022-11-08 PROCEDURE — 3074F SYST BP LT 130 MM HG: CPT | Performed by: FAMILY MEDICINE
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
## 2022-11-09 ENCOUNTER — OFFICE VISIT (OUTPATIENT)
Dept: PSYCHOLOGY | Age: 62
End: 2022-11-09

## 2022-11-09 DIAGNOSIS — F41.8 DEPRESSION WITH ANXIETY: Primary | ICD-10-CM

## 2022-11-09 DIAGNOSIS — Z63.0 PROBLEMS IN RELATIONSHIP WITH SPOUSE OR PARTNER: ICD-10-CM

## 2022-11-09 SDOH — SOCIAL STABILITY - SOCIAL INSECURITY: PROBLEMS IN RELATIONSHIP WITH SPOUSE OR PARTNER: Z63.0

## 2022-11-09 ASSESSMENT — PATIENT HEALTH QUESTIONNAIRE - PHQ9
10. IF YOU CHECKED OFF ANY PROBLEMS, HOW DIFFICULT HAVE THESE PROBLEMS MADE IT FOR YOU TO DO YOUR WORK, TAKE CARE OF THINGS AT HOME, OR GET ALONG WITH OTHER PEOPLE: 0
3. TROUBLE FALLING OR STAYING ASLEEP: 3
4. FEELING TIRED OR HAVING LITTLE ENERGY: 3
SUM OF ALL RESPONSES TO PHQ QUESTIONS 1-9: 16
5. POOR APPETITE OR OVEREATING: 1
9. THOUGHTS THAT YOU WOULD BE BETTER OFF DEAD, OR OF HURTING YOURSELF: 2
SUM OF ALL RESPONSES TO PHQ QUESTIONS 1-9: 16
2. FEELING DOWN, DEPRESSED OR HOPELESS: 3
SUM OF ALL RESPONSES TO PHQ QUESTIONS 1-9: 16
SUM OF ALL RESPONSES TO PHQ QUESTIONS 1-9: 14
1. LITTLE INTEREST OR PLEASURE IN DOING THINGS: 0
SUM OF ALL RESPONSES TO PHQ9 QUESTIONS 1 & 2: 3
8. MOVING OR SPEAKING SO SLOWLY THAT OTHER PEOPLE COULD HAVE NOTICED. OR THE OPPOSITE, BEING SO FIGETY OR RESTLESS THAT YOU HAVE BEEN MOVING AROUND A LOT MORE THAN USUAL: 0
6. FEELING BAD ABOUT YOURSELF - OR THAT YOU ARE A FAILURE OR HAVE LET YOURSELF OR YOUR FAMILY DOWN: 3
7. TROUBLE CONCENTRATING ON THINGS, SUCH AS READING THE NEWSPAPER OR WATCHING TELEVISION: 1

## 2022-11-09 ASSESSMENT — COLUMBIA-SUICIDE SEVERITY RATING SCALE - C-SSRS
6. HAVE YOU EVER DONE ANYTHING, STARTED TO DO ANYTHING, OR PREPARED TO DO ANYTHING TO END YOUR LIFE?: NO
2. HAVE YOU ACTUALLY HAD ANY THOUGHTS OF KILLING YOURSELF?: NO
1. WITHIN THE PAST MONTH, HAVE YOU WISHED YOU WERE DEAD OR WISHED YOU COULD GO TO SLEEP AND NOT WAKE UP?: NO

## 2022-11-09 ASSESSMENT — ANXIETY QUESTIONNAIRES
5. BEING SO RESTLESS THAT IT IS HARD TO SIT STILL: 0
6. BECOMING EASILY ANNOYED OR IRRITABLE: 0
3. WORRYING TOO MUCH ABOUT DIFFERENT THINGS: 3
1. FEELING NERVOUS, ANXIOUS, OR ON EDGE: 3
GAD7 TOTAL SCORE: 12
7. FEELING AFRAID AS IF SOMETHING AWFUL MIGHT HAPPEN: 2
2. NOT BEING ABLE TO STOP OR CONTROL WORRYING: 3
4. TROUBLE RELAXING: 1
IF YOU CHECKED OFF ANY PROBLEMS ON THIS QUESTIONNAIRE, HOW DIFFICULT HAVE THESE PROBLEMS MADE IT FOR YOU TO DO YOUR WORK, TAKE CARE OF THINGS AT HOME, OR GET ALONG WITH OTHER PEOPLE: NOT DIFFICULT AT ALL
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
## 2022-11-11 LAB
GENITAL CULTURE, ROUTINE: ABNORMAL
ORGANISM: ABNORMAL

## 2022-11-14 NOTE — PATIENT INSTRUCTIONS
Return to see Dr. Willie Rogers in 1 week. Pt instructed to begin calling referrals provided for ongoing therapy. I will bridge care until pt is connected.

## 2022-11-18 RX ORDER — CIPROFLOXACIN 500 MG/1
750 TABLET, FILM COATED ORAL 2 TIMES DAILY
Qty: 42 TABLET | Refills: 0 | Status: SHIPPED | OUTPATIENT
Start: 2022-11-18 | End: 2022-12-02

## 2022-12-06 DIAGNOSIS — I10 ESSENTIAL HYPERTENSION: ICD-10-CM

## 2022-12-06 RX ORDER — ATORVASTATIN CALCIUM 10 MG/1
10 TABLET, FILM COATED ORAL DAILY
Qty: 90 TABLET | Refills: 1 | Status: SHIPPED | OUTPATIENT
Start: 2022-12-06

## 2022-12-06 RX ORDER — LEVOTHYROXINE SODIUM 0.05 MG/1
TABLET ORAL
Qty: 90 TABLET | Refills: 1 | Status: SHIPPED | OUTPATIENT
Start: 2022-12-06

## 2022-12-06 RX ORDER — AZELASTINE 1 MG/ML
SPRAY, METERED NASAL
Qty: 30 ML | Refills: 1 | Status: SHIPPED | OUTPATIENT
Start: 2022-12-06

## 2022-12-06 RX ORDER — LISINOPRIL 10 MG/1
TABLET ORAL
Qty: 180 TABLET | Refills: 1 | Status: SHIPPED | OUTPATIENT
Start: 2022-12-06

## 2023-01-03 ENCOUNTER — OFFICE VISIT (OUTPATIENT)
Dept: PRIMARY CARE CLINIC | Age: 63
End: 2023-01-03
Payer: COMMERCIAL

## 2023-01-03 VITALS
DIASTOLIC BLOOD PRESSURE: 82 MMHG | SYSTOLIC BLOOD PRESSURE: 138 MMHG | OXYGEN SATURATION: 99 % | BODY MASS INDEX: 26.85 KG/M2 | HEIGHT: 64 IN | HEART RATE: 70 BPM

## 2023-01-03 DIAGNOSIS — J45.20 MILD INTERMITTENT ASTHMA, UNSPECIFIED WHETHER COMPLICATED: ICD-10-CM

## 2023-01-03 DIAGNOSIS — J06.9 UPPER RESPIRATORY TRACT INFECTION, UNSPECIFIED TYPE: Primary | ICD-10-CM

## 2023-01-03 PROCEDURE — 99213 OFFICE O/P EST LOW 20 MIN: CPT | Performed by: FAMILY MEDICINE

## 2023-01-03 PROCEDURE — 3079F DIAST BP 80-89 MM HG: CPT | Performed by: FAMILY MEDICINE

## 2023-01-03 PROCEDURE — 3075F SYST BP GE 130 - 139MM HG: CPT | Performed by: FAMILY MEDICINE

## 2023-01-03 RX ORDER — ALBUTEROL SULFATE 90 UG/1
2 AEROSOL, METERED RESPIRATORY (INHALATION) EVERY 6 HOURS PRN
Qty: 18 G | Refills: 5 | Status: SHIPPED | OUTPATIENT
Start: 2023-01-03

## 2023-01-03 RX ORDER — BENZONATATE 100 MG/1
100-200 CAPSULE ORAL 3 TIMES DAILY PRN
Qty: 42 CAPSULE | Refills: 0 | Status: SHIPPED | OUTPATIENT
Start: 2023-01-03 | End: 2023-01-10

## 2023-01-03 ASSESSMENT — ENCOUNTER SYMPTOMS
SORE THROAT: 1
WHEEZING: 1
SINUS PRESSURE: 1
ABDOMINAL PAIN: 0
DIARRHEA: 0
COUGH: 1
VOMITING: 0
RHINORRHEA: 1
NAUSEA: 0

## 2023-01-03 ASSESSMENT — PATIENT HEALTH QUESTIONNAIRE - PHQ9
SUM OF ALL RESPONSES TO PHQ QUESTIONS 1-9: 0
3. TROUBLE FALLING OR STAYING ASLEEP: 0
6. FEELING BAD ABOUT YOURSELF - OR THAT YOU ARE A FAILURE OR HAVE LET YOURSELF OR YOUR FAMILY DOWN: 0
7. TROUBLE CONCENTRATING ON THINGS, SUCH AS READING THE NEWSPAPER OR WATCHING TELEVISION: 0
9. THOUGHTS THAT YOU WOULD BE BETTER OFF DEAD, OR OF HURTING YOURSELF: 0
5. POOR APPETITE OR OVEREATING: 0
4. FEELING TIRED OR HAVING LITTLE ENERGY: 0
2. FEELING DOWN, DEPRESSED OR HOPELESS: 0
SUM OF ALL RESPONSES TO PHQ QUESTIONS 1-9: 0
8. MOVING OR SPEAKING SO SLOWLY THAT OTHER PEOPLE COULD HAVE NOTICED. OR THE OPPOSITE, BEING SO FIGETY OR RESTLESS THAT YOU HAVE BEEN MOVING AROUND A LOT MORE THAN USUAL: 0

## 2023-01-03 NOTE — PROGRESS NOTES
Slim Ramey (:  1960) is a 63 y.o. female,Established patient, here for evaluation of the following chief complaint(s):  Cough and Nasal Congestion (Face pain)      ASSESSMENT/PLAN:  1. Upper respiratory tract infection, unspecified type  -     albuterol sulfate HFA (PROVENTIL HFA) 108 (90 Base) MCG/ACT inhaler; Inhale 2 puffs into the lungs every 6 hours as needed for Wheezing or Shortness of Breath, Disp-18 g, R-5Normal  -     benzonatate (TESSALON) 100 MG capsule; Take 1-2 capsules by mouth 3 times daily as needed for Cough, Disp-42 capsule, R-0Normal  2. Mild intermittent asthma, unspecified whether complicated  -     albuterol sulfate HFA (PROVENTIL HFA) 108 (90 Base) MCG/ACT inhaler; Inhale 2 puffs into the lungs every 6 hours as needed for Wheezing or Shortness of Breath, Disp-18 g, R-5Normal  -     benzonatate (TESSALON) 100 MG capsule; Take 1-2 capsules by mouth 3 times daily as needed for Cough, Disp-42 capsule, R-0Normal    Return if symptoms worsen or fail to improve. Allergic to prednisone as per chart hence unable to prescribe  Symptomatic management with Tessalon Perles and albuterol inhaler as discussed  Most likely viral, red flag symptoms discussed  If no improvement or worsening symptoms by the end of the week advised to call the office, consider antibiotics at that point  Patient agreeable to plan and demonstrates understanding. SUBJECTIVE/OBJECTIVE:  HPI      Cough and Congestion  - Covid neg at home 23  - Rapid flu neg  - no exposure to covid or flu  - kids have bronchitis  - has a history of asthma - used 4 puffs   - symptoms started Friday, 5 days ago  - has albuterol inahlers  - cough drops. Delsom, vicks vapor rub  - history of walking pneumonia  - never been hospitalized      Review of Systems   Constitutional:  Positive for chills (5 days ago). Negative for fever. HENT:  Positive for congestion, rhinorrhea, sinus pressure and sore throat.  Negative for postnasal drip.    Respiratory:  Positive for cough and wheezing. Gastrointestinal:  Negative for abdominal pain, diarrhea, nausea and vomiting. Genitourinary:  Negative for dysuria and urgency. Neurological:  Positive for light-headedness and headaches. Negative for dizziness. Physical Exam  Constitutional:       General: She is not in acute distress. Appearance: Normal appearance. She is ill-appearing. HENT:      Head: Normocephalic and atraumatic. Right Ear: Tympanic membrane and external ear normal.      Left Ear: Tympanic membrane and external ear normal.      Nose: Congestion present. No rhinorrhea. Eyes:      General:         Right eye: No discharge. Left eye: No discharge. Extraocular Movements: Extraocular movements intact. Conjunctiva/sclera: Conjunctivae normal.   Cardiovascular:      Rate and Rhythm: Normal rate and regular rhythm. Heart sounds: Normal heart sounds. No murmur heard. Pulmonary:      Effort: Pulmonary effort is normal.      Breath sounds: Normal breath sounds. No wheezing. Musculoskeletal:         General: No swelling or tenderness. Normal range of motion. Cervical back: Normal range of motion and neck supple. Skin:     General: Skin is warm and dry. Neurological:      General: No focal deficit present. Mental Status: She is alert and oriented to person, place, and time. Psychiatric:         Mood and Affect: Mood normal.         Behavior: Behavior normal.         An electronic signature was used to authenticate this note.     --Daja Ward MD

## 2023-01-09 ENCOUNTER — OFFICE VISIT (OUTPATIENT)
Dept: PRIMARY CARE CLINIC | Age: 63
End: 2023-01-09
Payer: COMMERCIAL

## 2023-01-09 VITALS
SYSTOLIC BLOOD PRESSURE: 130 MMHG | HEART RATE: 88 BPM | HEIGHT: 64 IN | BODY MASS INDEX: 26.29 KG/M2 | WEIGHT: 154 LBS | OXYGEN SATURATION: 99 % | DIASTOLIC BLOOD PRESSURE: 72 MMHG

## 2023-01-09 DIAGNOSIS — J06.9 UPPER RESPIRATORY TRACT INFECTION, UNSPECIFIED TYPE: ICD-10-CM

## 2023-01-09 DIAGNOSIS — J45.20 MILD INTERMITTENT ASTHMA, UNSPECIFIED WHETHER COMPLICATED: ICD-10-CM

## 2023-01-09 DIAGNOSIS — B96.89 ACUTE BACTERIAL SINUSITIS: Primary | ICD-10-CM

## 2023-01-09 DIAGNOSIS — R07.9 CHEST PAIN IN ADULT: ICD-10-CM

## 2023-01-09 DIAGNOSIS — J01.90 ACUTE BACTERIAL SINUSITIS: Primary | ICD-10-CM

## 2023-01-09 PROCEDURE — 3078F DIAST BP <80 MM HG: CPT | Performed by: FAMILY MEDICINE

## 2023-01-09 PROCEDURE — 93000 ELECTROCARDIOGRAM COMPLETE: CPT | Performed by: FAMILY MEDICINE

## 2023-01-09 PROCEDURE — 3075F SYST BP GE 130 - 139MM HG: CPT | Performed by: FAMILY MEDICINE

## 2023-01-09 PROCEDURE — 99214 OFFICE O/P EST MOD 30 MIN: CPT | Performed by: FAMILY MEDICINE

## 2023-01-09 RX ORDER — LEVOFLOXACIN 750 MG/1
750 TABLET ORAL DAILY
Qty: 7 TABLET | Refills: 0 | Status: SHIPPED | OUTPATIENT
Start: 2023-01-09 | End: 2023-01-16

## 2023-01-09 RX ORDER — BENZONATATE 100 MG/1
100-200 CAPSULE ORAL 3 TIMES DAILY PRN
Qty: 42 CAPSULE | Refills: 0 | Status: SHIPPED | OUTPATIENT
Start: 2023-01-09 | End: 2023-01-16

## 2023-01-09 ASSESSMENT — ENCOUNTER SYMPTOMS
DIARRHEA: 1
VOMITING: 0
SINUS PRESSURE: 1
ABDOMINAL PAIN: 0
NAUSEA: 0
COUGH: 1
SORE THROAT: 0

## 2023-01-09 NOTE — PROGRESS NOTES
Abelardo Mckoy (:  1960) is a 63 y.o. female,Established patient, here for evaluation of the following chief complaint(s):  Chest Pain and Cough (2 weeks)      ASSESSMENT/PLAN:  1. Acute bacterial sinusitis  -     levoFLOXacin (LEVAQUIN) 750 MG tablet; Take 1 tablet by mouth daily for 7 days, Disp-7 tablet, R-0Normal  -     XR CHEST STANDARD (2 VW); Future  2. Chest pain in adult  -     EKG 12 lead; Future  -     levoFLOXacin (LEVAQUIN) 750 MG tablet; Take 1 tablet by mouth daily for 7 days, Disp-7 tablet, R-0Normal  -     XR CHEST STANDARD (2 VW); Future  3. Upper respiratory tract infection, unspecified type  -     benzonatate (TESSALON) 100 MG capsule; Take 1-2 capsules by mouth 3 times daily as needed for Cough, Disp-42 capsule, R-0Normal  4. Mild intermittent asthma, unspecified whether complicated  -     benzonatate (TESSALON) 100 MG capsule; Take 1-2 capsules by mouth 3 times daily as needed for Cough, Disp-42 capsule, R-0Normal    Chest pain with EKG NSR, most likely due to ongoing cough and upper respiratory infection  Patient does have history of pneumonia, if antibiotics do not help or patient's symptoms worsen consider chest x-ray which is already ordered  Patient to call office mid week if not feeling better despite treatment    Return if symptoms worsen or fail to improve. SUBJECTIVE/OBJECTIVE:  HPI  Chest Pain   - EKG NSR  - recent URI  - coughing a lot  - started Friday - \"been is sore\", \"stays there\" no radiation  - coughing a lot when sitting and lying down  - chest pain \"today is really good\"  - using cough medication, does not feel that there is any change in her cough  - history of walking pneumonia, 10 years ago and other pna on a cruise 6 years ago    Review of Systems   Constitutional:  Positive for chills and fever (\"feels hot\"). HENT:  Positive for sinus pressure. Negative for congestion and sore throat. Respiratory:  Positive for cough.     Cardiovascular:  Positive for chest pain. Gastrointestinal:  Positive for diarrhea. Negative for abdominal pain, nausea and vomiting. Genitourinary:  Negative for dysuria and urgency. Neurological:  Positive for headaches. Negative for light-headedness. Physical Exam  Constitutional:       General: She is not in acute distress. Appearance: She is ill-appearing. HENT:      Head: Normocephalic and atraumatic. Right Ear: External ear normal.      Left Ear: External ear normal.      Nose: Nose normal. No congestion or rhinorrhea. Eyes:      General:         Right eye: No discharge. Left eye: No discharge. Extraocular Movements: Extraocular movements intact. Conjunctiva/sclera: Conjunctivae normal.   Cardiovascular:      Rate and Rhythm: Normal rate and regular rhythm. Heart sounds: Normal heart sounds. No murmur heard. Pulmonary:      Effort: Pulmonary effort is normal. No respiratory distress. Breath sounds: Normal breath sounds. No wheezing. Musculoskeletal:         General: No swelling or tenderness. Normal range of motion. Cervical back: Normal range of motion and neck supple. Skin:     General: Skin is warm and dry. Neurological:      General: No focal deficit present. Mental Status: She is alert and oriented to person, place, and time. Psychiatric:         Mood and Affect: Mood normal.         Behavior: Behavior normal.         An electronic signature was used to authenticate this note.     --Neal Alcocer MD

## 2023-01-23 ENCOUNTER — HOSPITAL ENCOUNTER (OUTPATIENT)
Dept: WOMENS IMAGING | Age: 63
Discharge: HOME | End: 2023-01-23
Payer: COMMERCIAL

## 2023-01-23 VITALS — WEIGHT: 154 LBS | BODY MASS INDEX: 26.29 KG/M2 | HEIGHT: 64 IN

## 2023-01-23 DIAGNOSIS — Z12.31 VISIT FOR SCREENING MAMMOGRAM: ICD-10-CM

## 2023-01-23 PROCEDURE — 77067 SCR MAMMO BI INCL CAD: CPT

## 2023-03-10 ENCOUNTER — OFFICE VISIT (OUTPATIENT)
Dept: PRIMARY CARE CLINIC | Age: 63
End: 2023-03-10
Payer: COMMERCIAL

## 2023-03-10 VITALS
WEIGHT: 153 LBS | OXYGEN SATURATION: 99 % | SYSTOLIC BLOOD PRESSURE: 120 MMHG | TEMPERATURE: 97.3 F | BODY MASS INDEX: 26.26 KG/M2 | DIASTOLIC BLOOD PRESSURE: 86 MMHG | RESPIRATION RATE: 13 BRPM | HEART RATE: 85 BPM

## 2023-03-10 DIAGNOSIS — J45.20 MILD INTERMITTENT ASTHMA, UNSPECIFIED WHETHER COMPLICATED: ICD-10-CM

## 2023-03-10 DIAGNOSIS — J06.9 UPPER RESPIRATORY TRACT INFECTION, UNSPECIFIED TYPE: Primary | ICD-10-CM

## 2023-03-10 DIAGNOSIS — R42 DIZZY SPELLS: ICD-10-CM

## 2023-03-10 LAB — S PYO AG THROAT QL: NORMAL

## 2023-03-10 PROCEDURE — 3079F DIAST BP 80-89 MM HG: CPT | Performed by: FAMILY MEDICINE

## 2023-03-10 PROCEDURE — 99214 OFFICE O/P EST MOD 30 MIN: CPT | Performed by: FAMILY MEDICINE

## 2023-03-10 PROCEDURE — 3074F SYST BP LT 130 MM HG: CPT | Performed by: FAMILY MEDICINE

## 2023-03-10 PROCEDURE — 87880 STREP A ASSAY W/OPTIC: CPT | Performed by: FAMILY MEDICINE

## 2023-03-10 RX ORDER — AMOXICILLIN AND CLAVULANATE POTASSIUM 875; 125 MG/1; MG/1
1 TABLET, FILM COATED ORAL 2 TIMES DAILY
Qty: 20 TABLET | Refills: 0 | Status: SHIPPED | OUTPATIENT
Start: 2023-03-10 | End: 2023-03-20

## 2023-03-10 RX ORDER — AZELASTINE 1 MG/ML
2 SPRAY, METERED NASAL 2 TIMES DAILY
Qty: 120 ML | Refills: 1 | Status: SHIPPED | OUTPATIENT
Start: 2023-03-10

## 2023-03-10 SDOH — ECONOMIC STABILITY: INCOME INSECURITY: HOW HARD IS IT FOR YOU TO PAY FOR THE VERY BASICS LIKE FOOD, HOUSING, MEDICAL CARE, AND HEATING?: NOT HARD AT ALL

## 2023-03-10 SDOH — ECONOMIC STABILITY: FOOD INSECURITY: WITHIN THE PAST 12 MONTHS, THE FOOD YOU BOUGHT JUST DIDN'T LAST AND YOU DIDN'T HAVE MONEY TO GET MORE.: NEVER TRUE

## 2023-03-10 SDOH — ECONOMIC STABILITY: FOOD INSECURITY: WITHIN THE PAST 12 MONTHS, YOU WORRIED THAT YOUR FOOD WOULD RUN OUT BEFORE YOU GOT MONEY TO BUY MORE.: NEVER TRUE

## 2023-03-10 ASSESSMENT — ENCOUNTER SYMPTOMS
SHORTNESS OF BREATH: 1
GASTROINTESTINAL NEGATIVE: 1
WHEEZING: 1
COUGH: 1
SORE THROAT: 1
EYES NEGATIVE: 1

## 2023-03-10 NOTE — PROGRESS NOTES
SUBJECTIVE:  Patient ID: Slim Ramey is a 66 y.o. y.o. female     HPI   Upper Respiratory Infection: Patient complains of symptoms of a URI. Symptoms include congestion, cough, and sore throat. Onset of symptoms was several days ago, gradually worsening since that time. She also c/o achiness, congestion, coryza, low grade fever, post nasal drip, and sore throat for the past 7 days . She is drinking plenty of fluids. Evaluation to date: none. Treatment to date: none.     Past Medical History:   Diagnosis Date    Asthma     Capsulitis of shoulder Feb 2012    left shoulder, Dr Esha Govea    Hypothyroid     IBS (irritable bowel syndrome) 2007    Nausea & vomiting     Raynaud's disease 2002    to feet      Past Surgical History:   Procedure Laterality Date    [address]    COLONOSCOPY  2007    Dr Orquidea Bhardwaj ARTHROSCOPY Left 2/4/2014    LEFT KNEE ARTHROSCOPE WITH PARTIAL MEDIAL MENISCECTOMY, SYNOVECTOMY AND CHONDROPLASTY                                SHOULDER ARTHROSCOPY  9-4-2012    left shoulder arthroscopy with subacromial decompression , distal clavicle excision ,synovectomy,labral debridement    SHOULDER ARTHROSCOPY  10/16/2012    RIGHT SHOULDER ARTHROSCOPE, SUBACROMIAL DECOMPRESSION, DISTAL    UPPER GASTROINTESTINAL ENDOSCOPY  2007    Dr Guy Staff     Family History   Problem Relation Age of Onset    Diabetes Mother     High Blood Pressure Mother     High Blood Pressure Father     Heart Disease Father     High Blood Pressure Maternal Grandfather      Social History     Socioeconomic History    Marital status:    Tobacco Use    Smoking status: Never    Smokeless tobacco: Never   Substance and Sexual Activity    Alcohol use: No    Drug use: No     Social Determinants of Health     Financial Resource Strain: Low Risk     Difficulty of Paying Living Expenses: Not hard at all   Food Insecurity: No Food Insecurity    Worried About [address] in the Last Year: Never true    [place] [employer] in the Last Year: Never true   Transportation Needs: Unknown    Lack of Transportation (Non-Medical): No   Housing Stability: Unknown    Unstable Housing in the Last Year: No     Current Outpatient Medications   Medication Sig Dispense Refill    lisinopril (PRINIVIL;ZESTRIL) 10 MG tablet TAKE ONE TABLET BY MOUTH TWICE A  tablet 1    levothyroxine (SYNTHROID) 50 MCG tablet TAKE ONE TABLET BY MOUTH DAILY 90 tablet 1    atorvastatin (LIPITOR) 10 MG tablet Take 1 tablet by mouth daily 90 tablet 1    albuterol sulfate HFA (PROVENTIL HFA) 108 (90 Base) MCG/ACT inhaler Inhale 2 puffs into the lungs every 6 hours as needed for Wheezing or Shortness of Breath 18 g 5    azelastine (ASTELIN) 0.1 % nasal spray SPRAY TWO SPRAYS IN EACH NOSTRIL TWICE DAILY 30 mL 1    EPINEPHrine (EPIPEN 2-DEVAN) 0.3 MG/0.3ML JESS injection Inject 0.3 mLs into the muscle once for 1 dose. Use as directed for allergic reaction 2 Device 1     No current facility-administered medications for this visit. Allergies   Allergen Reactions    Latex Hives    Advil [Ibuprofen Micronized]     Aspirin     Demerol     Estrogens Conjugated      Joint pain      Hydrocodone Hives and Swelling    Macrobid [Nitrofurantoin Monohydrate Macrocrystals]     Prednisone     Vicodin [Hydrocodone-Acetaminophen]     Zithromax [Azithromycin Dihydrate]     Zyrtec [Cetirizine Hcl]     Iodine Rash       Review of Systems   Constitutional:  Positive for chills and fever. HENT:  Positive for congestion, postnasal drip and sore throat. Eyes: Negative. Respiratory:  Positive for cough, shortness of breath and wheezing. Cardiovascular: Negative. Gastrointestinal: Negative. All other systems reviewed and are negative.     OBJECTIVE:  /86 (Site: Left Upper Arm, Position: Sitting, Cuff Size: Medium Adult)   Pulse 85   Temp 97.3 °F (36.3 °C)   Resp 13   Wt 153 lb (69.4 kg)   LMP 01/23/2014 (Approximate)   SpO2 99%   BMI 26.26 kg/m²     Physical Exam  Vitals reviewed. Constitutional:       Appearance: Normal appearance. HENT:      Head: Normocephalic and atraumatic. Right Ear: Tympanic membrane normal.      Left Ear: Tympanic membrane normal.      Nose: Congestion present. Mouth/Throat:      Pharynx: Posterior oropharyngeal erythema present. Eyes:      General: No scleral icterus. Conjunctiva/sclera: Conjunctivae normal.   Neck:      Thyroid: No thyromegaly. Vascular: No JVD. Cardiovascular:      Rate and Rhythm: Normal rate and regular rhythm. Heart sounds: Normal heart sounds. No murmur heard. No friction rub. No gallop. Pulmonary:      Effort: Pulmonary effort is normal.      Breath sounds: Normal breath sounds. No wheezing or rales. Abdominal:      General: Bowel sounds are normal. There is no distension. Palpations: Abdomen is soft. There is no mass. Tenderness: There is no abdominal tenderness. Hernia: No hernia is present. Lymphadenopathy:      Cervical: No cervical adenopathy. Skin:     Findings: No rash. Neurological:      Mental Status: She is alert and oriented to person, place, and time. ASSESSMENT:   Diagnosis Orders   1. Upper respiratory tract infection, unspecified type  POCT rapid strep A    COVID-19      2. Mild intermittent asthma, unspecified whether complicated              PLAN:  See orders  Symptomatic treatment discussed with the patient  She can use her inhaler 2-3 times a day if needed  Call if not better.

## 2023-03-11 ENCOUNTER — TELEPHONE (OUTPATIENT)
Dept: PRIMARY CARE CLINIC | Age: 63
End: 2023-03-11

## 2023-03-11 LAB — SARS-COV-2, PCR: DETECTED

## 2023-03-11 NOTE — TELEPHONE ENCOUNTER
Patient contacted on call provider. Saw +covid results in Mychart. Requesting paxlovid. Saw Dr Katina Rodríguez 3/10/23. Returned call, went to  x 2. Left detailed msg. Per office notes, symptoms have been present for >7 days. Not a candidate for paxlovid. Advised to continue with meds as prescribed  by Dr Katina Rodríguez and to continue with symptomatic treatment.

## 2023-04-04 DIAGNOSIS — J45.20 MILD INTERMITTENT ASTHMA, UNSPECIFIED WHETHER COMPLICATED: ICD-10-CM

## 2023-04-04 DIAGNOSIS — J06.9 UPPER RESPIRATORY TRACT INFECTION, UNSPECIFIED TYPE: ICD-10-CM

## 2023-04-04 RX ORDER — ALBUTEROL SULFATE 90 UG/1
2 AEROSOL, METERED RESPIRATORY (INHALATION) EVERY 6 HOURS PRN
Qty: 18 G | Refills: 5 | Status: SHIPPED | OUTPATIENT
Start: 2023-04-04 | End: 2023-04-06 | Stop reason: SDUPTHER

## 2023-04-04 RX ORDER — LEVOTHYROXINE SODIUM 0.05 MG/1
TABLET ORAL
Qty: 90 TABLET | Refills: 1 | Status: SHIPPED | OUTPATIENT
Start: 2023-04-04 | End: 2023-04-06 | Stop reason: SDUPTHER

## 2023-04-04 NOTE — TELEPHONE ENCOUNTER
Patient needs a refill on the following medication       albuterol sulfate HFA (PROVENTIL HFA) 108 (90 Base) MCG/ACT inhaler [[id number]]     Order Details  Dose: 2 puff Route: Inhalation Frequency: EVERY 6 HOURS PRN for Wheezing, Shortness of Breath   Dispense Quantity: 18 g Refills: 5          Sig: Inhale 2 puffs into the lungs every 6 hours as needed for Wheezing or Shortness of Breath       levothyroxine (SYNTHROID) 50 MCG tablet [[id number]]     Order Details  Dose, Route, Frequency: As Directed   Dispense Quantity: 90 tablet Refills: 1          Sig: TAKE ONE TABLET BY [address] [facility] [phone]   [address] [address]   Phone:  [phone]  Fax:  [phone]       Patient is leaving in a few days [place] go [place]       Thank you

## 2023-04-04 NOTE — TELEPHONE ENCOUNTER
Medication:   Requested Prescriptions     Pending Prescriptions Disp Refills    albuterol sulfate HFA (PROVENTIL HFA) 108 (90 Base) MCG/ACT inhaler 18 g 5     Sig: Inhale 2 puffs into the lungs every 6 hours as needed for Wheezing or Shortness of Breath    levothyroxine (SYNTHROID) 50 MCG tablet 90 tablet 1     Sig: TAKE ONE TABLET BY MOUTH DAILY       Last Filled:      Patient Phone Number: [phone] (home)     Last appt: 3/10/2023   Next appt: Visit date not found    Last Thyroid:   Lab Results   Component Value Date/Time    TSH 3.86 10/19/2022 09:04 AM    FT3 2.7 05/18/2015 12:12 PM    Z2HPERE 9.8 05/18/2015 12:12 PM       Last OARRS: No flowsheet data found. Preferred Pharmacy:   [facility] [id number] - [address]  [address]  Phone: [phone] Fax: [phone]    [facility] [address] [facility] [phone]  [address] [place] [address]  Phone: [phone] Fax: [phone] [address] [phone] - f [phone]   [address] [address]  Phone: [phone] Fax: [phone]

## 2023-06-27 ENCOUNTER — OFFICE VISIT (OUTPATIENT)
Dept: PRIMARY CARE CLINIC | Age: 63
End: 2023-06-27
Payer: COMMERCIAL

## 2023-06-27 VITALS
BODY MASS INDEX: 26.63 KG/M2 | RESPIRATION RATE: 16 BRPM | HEART RATE: 61 BPM | OXYGEN SATURATION: 99 % | WEIGHT: 156 LBS | DIASTOLIC BLOOD PRESSURE: 78 MMHG | HEIGHT: 64 IN | SYSTOLIC BLOOD PRESSURE: 130 MMHG | TEMPERATURE: 97.5 F

## 2023-06-27 DIAGNOSIS — J45.20 MILD INTERMITTENT ASTHMA, UNSPECIFIED WHETHER COMPLICATED: ICD-10-CM

## 2023-06-27 DIAGNOSIS — E03.9 ACQUIRED HYPOTHYROIDISM: ICD-10-CM

## 2023-06-27 DIAGNOSIS — I10 ESSENTIAL HYPERTENSION: Primary | ICD-10-CM

## 2023-06-27 DIAGNOSIS — R73.9 HYPERGLYCEMIA: ICD-10-CM

## 2023-06-27 PROCEDURE — 3075F SYST BP GE 130 - 139MM HG: CPT | Performed by: FAMILY MEDICINE

## 2023-06-27 PROCEDURE — 3078F DIAST BP <80 MM HG: CPT | Performed by: FAMILY MEDICINE

## 2023-06-27 PROCEDURE — 99214 OFFICE O/P EST MOD 30 MIN: CPT | Performed by: FAMILY MEDICINE

## 2023-06-27 RX ORDER — LEVOTHYROXINE SODIUM 0.05 MG/1
TABLET ORAL
Qty: 90 TABLET | Refills: 1 | Status: SHIPPED | OUTPATIENT
Start: 2023-06-27

## 2023-06-27 ASSESSMENT — ENCOUNTER SYMPTOMS
RESPIRATORY NEGATIVE: 1
EYES NEGATIVE: 1
GASTROINTESTINAL NEGATIVE: 1

## 2023-07-03 DIAGNOSIS — E03.9 ACQUIRED HYPOTHYROIDISM: ICD-10-CM

## 2023-07-03 DIAGNOSIS — I10 ESSENTIAL HYPERTENSION: ICD-10-CM

## 2023-07-03 LAB
ALBUMIN SERPL-MCNC: 4.6 G/DL (ref 3.4–5)
ALP SERPL-CCNC: 95 U/L (ref 40–129)
ALT SERPL-CCNC: 15 U/L (ref 10–40)
ANION GAP SERPL CALCULATED.3IONS-SCNC: 9 MMOL/L (ref 3–16)
AST SERPL-CCNC: 29 U/L (ref 15–37)
BASOPHILS # BLD: 0 K/UL (ref 0–0.2)
BASOPHILS NFR BLD: 0.5 %
BILIRUB DIRECT SERPL-MCNC: <0.2 MG/DL (ref 0–0.3)
BILIRUB INDIRECT SERPL-MCNC: NORMAL MG/DL (ref 0–1)
BILIRUB SERPL-MCNC: <0.2 MG/DL (ref 0–1)
BUN SERPL-MCNC: 18 MG/DL (ref 7–20)
CALCIUM SERPL-MCNC: 9.9 MG/DL (ref 8.3–10.6)
CHLORIDE SERPL-SCNC: 105 MMOL/L (ref 99–110)
CHOLEST SERPL-MCNC: 221 MG/DL (ref 0–199)
CO2 SERPL-SCNC: 27 MMOL/L (ref 21–32)
CREAT SERPL-MCNC: 0.9 MG/DL (ref 0.6–1.2)
DEPRECATED RDW RBC AUTO: 14 % (ref 12.4–15.4)
EOSINOPHIL # BLD: 0.2 K/UL (ref 0–0.6)
EOSINOPHIL NFR BLD: 3.8 %
EST. AVERAGE GLUCOSE BLD GHB EST-MCNC: 125.5 MG/DL
GFR SERPLBLD CREATININE-BSD FMLA CKD-EPI: >60 ML/MIN/{1.73_M2}
GLUCOSE SERPL-MCNC: 105 MG/DL (ref 70–99)
HBA1C MFR BLD: 6 %
HCT VFR BLD AUTO: 35.7 % (ref 36–48)
HDLC SERPL-MCNC: 83 MG/DL (ref 40–60)
HGB BLD-MCNC: 11.9 G/DL (ref 12–16)
LDLC SERPL CALC-MCNC: 125 MG/DL
LYMPHOCYTES # BLD: 2.6 K/UL (ref 1–5.1)
LYMPHOCYTES NFR BLD: 51.2 %
MCH RBC QN AUTO: 27.1 PG (ref 26–34)
MCHC RBC AUTO-ENTMCNC: 33.2 G/DL (ref 31–36)
MCV RBC AUTO: 81.6 FL (ref 80–100)
MONOCYTES # BLD: 0.2 K/UL (ref 0–1.3)
MONOCYTES NFR BLD: 4.7 %
NEUTROPHILS # BLD: 2 K/UL (ref 1.7–7.7)
NEUTROPHILS NFR BLD: 39.8 %
PLATELET # BLD AUTO: 165 K/UL (ref 135–450)
PMV BLD AUTO: 10.7 FL (ref 5–10.5)
POTASSIUM SERPL-SCNC: 5.1 MMOL/L (ref 3.5–5.1)
PROT SERPL-MCNC: 7.5 G/DL (ref 6.4–8.2)
RBC # BLD AUTO: 4.38 M/UL (ref 4–5.2)
SODIUM SERPL-SCNC: 141 MMOL/L (ref 136–145)
TRIGL SERPL-MCNC: 63 MG/DL (ref 0–150)
TSH SERPL DL<=0.005 MIU/L-ACNC: 2.7 UIU/ML (ref 0.27–4.2)
VLDLC SERPL CALC-MCNC: 13 MG/DL
WBC # BLD AUTO: 5 K/UL (ref 4–11)

## 2023-07-05 ENCOUNTER — TELEPHONE (OUTPATIENT)
Dept: PRIMARY CARE CLINIC | Age: 63
End: 2023-07-05

## 2023-07-05 NOTE — TELEPHONE ENCOUNTER
Pt called to get lab results. Pls call pt back when lab results are resulted on. \"Pt states she saw her results online and there are some abnormalities.

## 2023-07-07 ENCOUNTER — TELEPHONE (OUTPATIENT)
Dept: PRIMARY CARE CLINIC | Age: 63
End: 2023-07-07

## 2023-07-11 ENCOUNTER — TELEPHONE (OUTPATIENT)
Dept: PRIMARY CARE CLINIC | Age: 63
End: 2023-07-11

## 2023-07-11 NOTE — TELEPHONE ENCOUNTER
Pt called and stated her hemoglobin and hematocrit values were low. And her MPV was elevated Pt states she has difficulty breathing in the morning. Pt states family has a history of blood disorders. Pls call pt back to discuss.

## 2023-10-10 ENCOUNTER — TELEPHONE (OUTPATIENT)
Dept: PRIMARY CARE CLINIC | Age: 63
End: 2023-10-10

## 2023-10-10 NOTE — TELEPHONE ENCOUNTER
Patient called and stated that she has a new job and she has to go for TB test.    Please review and advice    Thanks

## 2023-10-30 RX ORDER — LEVOTHYROXINE SODIUM 0.05 MG/1
TABLET ORAL
Qty: 90 TABLET | Refills: 1 | Status: SHIPPED | OUTPATIENT
Start: 2023-10-30

## 2023-10-30 NOTE — TELEPHONE ENCOUNTER
Pt called for meds refill. Pt is out of meds. Please contact pt when completed.     levothyroxine (SYNTHROID) 50 MCG tablet [[id number]]  [facility] - [place], [address] [facility] - [address] [place] [address] [phone] - F [phone]   [address] [place] [address],[address] [place] [address], [address] [place] [address]   Phone:  [phone]  Fax:  [phone]

## 2023-10-30 NOTE — TELEPHONE ENCOUNTER
Medication:   Requested Prescriptions     Pending Prescriptions Disp Refills    levothyroxine (SYNTHROID) 50 MCG tablet 90 tablet 1     Sig: TAKE ONE TABLET BY MOUTH DAILY       Last Filled:  06/27/23    Patient Phone Number: [phone] (home)     Last appt: 6/27/2023   Next appt: Visit date not found    Last Thyroid:   Lab Results   Component Value Date/Time    TSH 2.70 07/03/2023 07:53 AM    FT3 2.7 05/18/2015 12:12 PM    J3PDTHZ 9.8 05/18/2015 12:12 PM       Last OARRS:        No data to display                Preferred Pharmacy: [address] [place] [address] [phone]  [address] [address]  Phone: [phone] Fax: [phone] [facility] [address] [phone] - F [phone]  [address] [place] [address] Drive [address]  Phone: [phone] Fax: [phone]

## 2024-02-19 NOTE — PROGRESS NOTES
Thyroid: No thyromegaly or thyroid tenderness.      Vascular: No carotid bruit.   Cardiovascular:      Rate and Rhythm: Normal rate and regular rhythm.      Pulses:           Dorsalis pedis pulses are 2+ on the right side and 2+ on the left side.      Heart sounds: Normal heart sounds. No murmur heard.     No friction rub.   Pulmonary:      Effort: Pulmonary effort is normal. No respiratory distress.      Breath sounds: Normal breath sounds. No wheezing, rhonchi or rales.   Musculoskeletal:      Right lower leg: No edema.      Left lower leg: No edema.   Feet:      Right foot:      Skin integrity: Dry skin present.      Toenail Condition: Right toenails are abnormally thick. Fungal disease present.     Left foot:      Skin integrity: Dry skin present.      Toenail Condition: Left toenails are abnormally thick. Fungal disease present.     Comments: Toenails on second toes dark in color and abnormally thick.  Other toenail slightly discolored with some thickening  Lymphadenopathy:      Cervical: No cervical adenopathy.   Skin:     General: Skin is warm and dry.      Findings: No rash.   Neurological:      Mental Status: She is alert and oriented to person, place, and time.      Motor: No weakness.      Gait: Gait normal.   Psychiatric:         Mood and Affect: Mood normal.          Patient Active Problem List   Diagnosis    Raynaud disease    Anemia    IBS (irritable bowel syndrome)    Arthritis    Ankle joint effusion    Osteoarthritis, knee    Pain in joint, lower leg    Medial meniscus tear    Leg weakness    Primary localized osteoarthrosis, lower leg    Acquired hypothyroidism    Primary osteoarthritis of both knees    Essential hypertension    Combined hyperlipidemia    Mild intermittent asthma      Allergies   Allergen Reactions    Latex Hives    Advil [Ibuprofen Micronized]     Aspirin     Demerol     Estrogens Conjugated      Joint pain      Hydrocodone Hives and Swelling    Macrobid [Nitrofurantoin

## 2024-02-20 ENCOUNTER — OFFICE VISIT (OUTPATIENT)
Dept: PRIMARY CARE CLINIC | Age: 64
End: 2024-02-20
Payer: COMMERCIAL

## 2024-02-20 VITALS
WEIGHT: 160.8 LBS | TEMPERATURE: 97.3 F | OXYGEN SATURATION: 99 % | DIASTOLIC BLOOD PRESSURE: 82 MMHG | HEART RATE: 63 BPM | SYSTOLIC BLOOD PRESSURE: 126 MMHG | BODY MASS INDEX: 27.6 KG/M2

## 2024-02-20 DIAGNOSIS — R53.83 OTHER FATIGUE: ICD-10-CM

## 2024-02-20 DIAGNOSIS — J45.20 MILD INTERMITTENT ASTHMA, UNSPECIFIED WHETHER COMPLICATED: ICD-10-CM

## 2024-02-20 DIAGNOSIS — I73.00 RAYNAUD'S DISEASE WITHOUT GANGRENE: ICD-10-CM

## 2024-02-20 DIAGNOSIS — D64.9 ANEMIA, UNSPECIFIED TYPE: ICD-10-CM

## 2024-02-20 DIAGNOSIS — G47.9 SLEEP DISTURBANCE: ICD-10-CM

## 2024-02-20 DIAGNOSIS — E03.9 ACQUIRED HYPOTHYROIDISM: ICD-10-CM

## 2024-02-20 DIAGNOSIS — R53.83 OTHER FATIGUE: Primary | ICD-10-CM

## 2024-02-20 DIAGNOSIS — B35.1 TOENAIL FUNGUS: ICD-10-CM

## 2024-02-20 LAB
ANION GAP SERPL CALCULATED.3IONS-SCNC: 13 MMOL/L (ref 3–16)
BUN SERPL-MCNC: 17 MG/DL (ref 7–20)
CALCIUM SERPL-MCNC: 9.3 MG/DL (ref 8.3–10.6)
CHLORIDE SERPL-SCNC: 103 MMOL/L (ref 99–110)
CO2 SERPL-SCNC: 24 MMOL/L (ref 21–32)
CREAT SERPL-MCNC: 0.8 MG/DL (ref 0.6–1.2)
DEPRECATED RDW RBC AUTO: 13.7 % (ref 12.4–15.4)
FERRITIN SERPL IA-MCNC: 173.8 NG/ML (ref 15–150)
GFR SERPLBLD CREATININE-BSD FMLA CKD-EPI: >60 ML/MIN/{1.73_M2}
GLUCOSE SERPL-MCNC: 100 MG/DL (ref 70–99)
HCT VFR BLD AUTO: 35.6 % (ref 36–48)
HGB BLD-MCNC: 11.6 G/DL (ref 12–16)
IRON SATN MFR SERPL: 38 % (ref 15–50)
IRON SERPL-MCNC: 108 UG/DL (ref 37–145)
MCH RBC QN AUTO: 26.5 PG (ref 26–34)
MCHC RBC AUTO-ENTMCNC: 32.6 G/DL (ref 31–36)
MCV RBC AUTO: 81.4 FL (ref 80–100)
PLATELET # BLD AUTO: 174 K/UL (ref 135–450)
PMV BLD AUTO: 10.8 FL (ref 5–10.5)
POTASSIUM SERPL-SCNC: 4.6 MMOL/L (ref 3.5–5.1)
RBC # BLD AUTO: 4.37 M/UL (ref 4–5.2)
SODIUM SERPL-SCNC: 140 MMOL/L (ref 136–145)
T4 FREE SERPL-MCNC: 1.5 NG/DL (ref 0.9–1.8)
TIBC SERPL-MCNC: 287 UG/DL (ref 260–445)
TSH SERPL DL<=0.005 MIU/L-ACNC: 2.33 UIU/ML (ref 0.27–4.2)
VIT B12 SERPL-MCNC: 925 PG/ML (ref 211–911)
WBC # BLD AUTO: 4.9 K/UL (ref 4–11)

## 2024-02-20 PROCEDURE — 3079F DIAST BP 80-89 MM HG: CPT | Performed by: NURSE PRACTITIONER

## 2024-02-20 PROCEDURE — 3074F SYST BP LT 130 MM HG: CPT | Performed by: NURSE PRACTITIONER

## 2024-02-20 PROCEDURE — 99214 OFFICE O/P EST MOD 30 MIN: CPT | Performed by: NURSE PRACTITIONER

## 2024-02-20 RX ORDER — CICLOPIROX 80 MG/ML
SOLUTION TOPICAL
Qty: 6 ML | Refills: 0 | Status: SHIPPED | OUTPATIENT
Start: 2024-02-20

## 2024-02-20 ASSESSMENT — PATIENT HEALTH QUESTIONNAIRE - PHQ9
SUM OF ALL RESPONSES TO PHQ QUESTIONS 1-9: 0
SUM OF ALL RESPONSES TO PHQ9 QUESTIONS 1 & 2: 0
6. FEELING BAD ABOUT YOURSELF - OR THAT YOU ARE A FAILURE OR HAVE LET YOURSELF OR YOUR FAMILY DOWN: 0
5. POOR APPETITE OR OVEREATING: 0
4. FEELING TIRED OR HAVING LITTLE ENERGY: 0
7. TROUBLE CONCENTRATING ON THINGS, SUCH AS READING THE NEWSPAPER OR WATCHING TELEVISION: 0
1. LITTLE INTEREST OR PLEASURE IN DOING THINGS: 0
SUM OF ALL RESPONSES TO PHQ QUESTIONS 1-9: 0
8. MOVING OR SPEAKING SO SLOWLY THAT OTHER PEOPLE COULD HAVE NOTICED. OR THE OPPOSITE, BEING SO FIGETY OR RESTLESS THAT YOU HAVE BEEN MOVING AROUND A LOT MORE THAN USUAL: 0
SUM OF ALL RESPONSES TO PHQ QUESTIONS 1-9: 0
10. IF YOU CHECKED OFF ANY PROBLEMS, HOW DIFFICULT HAVE THESE PROBLEMS MADE IT FOR YOU TO DO YOUR WORK, TAKE CARE OF THINGS AT HOME, OR GET ALONG WITH OTHER PEOPLE: 0
3. TROUBLE FALLING OR STAYING ASLEEP: 0
9. THOUGHTS THAT YOU WOULD BE BETTER OFF DEAD, OR OF HURTING YOURSELF: 0
2. FEELING DOWN, DEPRESSED OR HOPELESS: 0
SUM OF ALL RESPONSES TO PHQ QUESTIONS 1-9: 0

## 2024-02-20 ASSESSMENT — ENCOUNTER SYMPTOMS
WHEEZING: 0
RHINORRHEA: 0
SINUS PRESSURE: 0
VOMITING: 0
SINUS PAIN: 0
SORE THROAT: 0
NAUSEA: 0
ABDOMINAL PAIN: 0
CHEST TIGHTNESS: 0
DIARRHEA: 0
ROS SKIN COMMENTS: NAIL CHANGES
COUGH: 0
SHORTNESS OF BREATH: 1
TROUBLE SWALLOWING: 0

## 2024-02-21 RX ORDER — AZELASTINE HYDROCHLORIDE 137 UG/1
SPRAY, METERED NASAL
Qty: 120 ML | Refills: 1 | OUTPATIENT
Start: 2024-02-21

## 2024-02-21 NOTE — TELEPHONE ENCOUNTER
Medication:   Requested Prescriptions     Pending Prescriptions Disp Refills    azelastine (ASTELIN) 0.1 % nasal spray 120 mL 1     Si sprays by Nasal route 2 times daily Use in each nostril as directed     Last Filled:  3/10/2023    Last appt: 2024   Next appt: Visit date not found    Last OARRS:        No data to display

## 2024-02-22 RX ORDER — AZELASTINE 1 MG/ML
2 SPRAY, METERED NASAL 2 TIMES DAILY
Qty: 120 ML | Refills: 0 | Status: SHIPPED | OUTPATIENT
Start: 2024-02-22

## 2024-03-05 ENCOUNTER — OFFICE VISIT (OUTPATIENT)
Dept: PRIMARY CARE CLINIC | Age: 64
End: 2024-03-05
Payer: COMMERCIAL

## 2024-03-05 VITALS
HEIGHT: 64 IN | SYSTOLIC BLOOD PRESSURE: 138 MMHG | WEIGHT: 162.6 LBS | DIASTOLIC BLOOD PRESSURE: 88 MMHG | OXYGEN SATURATION: 99 % | BODY MASS INDEX: 27.76 KG/M2 | TEMPERATURE: 97.3 F | HEART RATE: 68 BPM

## 2024-03-05 DIAGNOSIS — M79.10 MYALGIA: Primary | ICD-10-CM

## 2024-03-05 DIAGNOSIS — R53.83 OTHER FATIGUE: ICD-10-CM

## 2024-03-05 DIAGNOSIS — M79.10 MYALGIA: ICD-10-CM

## 2024-03-05 DIAGNOSIS — G47.30 SLEEP APNEA, UNSPECIFIED TYPE: ICD-10-CM

## 2024-03-05 LAB
BASOPHILS # BLD: 0 K/UL (ref 0–0.2)
BASOPHILS NFR BLD: 0.5 %
DEPRECATED RDW RBC AUTO: 13.6 % (ref 12.4–15.4)
EOSINOPHIL # BLD: 0.2 K/UL (ref 0–0.6)
EOSINOPHIL NFR BLD: 2.7 %
ERYTHROCYTE [SEDIMENTATION RATE] IN BLOOD BY WESTERGREN METHOD: 36 MM/HR (ref 0–30)
HCT VFR BLD AUTO: 36.2 % (ref 36–48)
HGB BLD-MCNC: 12 G/DL (ref 12–16)
LYMPHOCYTES # BLD: 3.4 K/UL (ref 1–5.1)
LYMPHOCYTES NFR BLD: 53 %
MCH RBC QN AUTO: 26.8 PG (ref 26–34)
MCHC RBC AUTO-ENTMCNC: 33.1 G/DL (ref 31–36)
MCV RBC AUTO: 81 FL (ref 80–100)
MONOCYTES # BLD: 0.3 K/UL (ref 0–1.3)
MONOCYTES NFR BLD: 5.3 %
NEUTROPHILS # BLD: 2.5 K/UL (ref 1.7–7.7)
NEUTROPHILS NFR BLD: 38.5 %
PLATELET # BLD AUTO: 178 K/UL (ref 135–450)
PMV BLD AUTO: 10.5 FL (ref 5–10.5)
RBC # BLD AUTO: 4.47 M/UL (ref 4–5.2)
WBC # BLD AUTO: 6.5 K/UL (ref 4–11)

## 2024-03-05 PROCEDURE — 3075F SYST BP GE 130 - 139MM HG: CPT | Performed by: FAMILY MEDICINE

## 2024-03-05 PROCEDURE — 3079F DIAST BP 80-89 MM HG: CPT | Performed by: FAMILY MEDICINE

## 2024-03-05 PROCEDURE — 99214 OFFICE O/P EST MOD 30 MIN: CPT | Performed by: FAMILY MEDICINE

## 2024-03-05 ASSESSMENT — ENCOUNTER SYMPTOMS
EYES NEGATIVE: 1
RESPIRATORY NEGATIVE: 1
GASTROINTESTINAL NEGATIVE: 1

## 2024-03-05 NOTE — PROGRESS NOTES
Substance and Sexual Activity    Alcohol use: No    Drug use: No     Social Determinants of Health     Financial Resource Strain: Low Risk  (3/10/2023)    Overall Financial Resource Strain (CARDIA)     Difficulty of Paying Living Expenses: Not hard at all   Transportation Needs: Unknown (3/10/2023)    PRAPARE - Transportation     Lack of Transportation (Non-Medical): No   Physical Activity: Sufficiently Active (9/9/2021)    Exercise Vital Sign     Days of Exercise per Week: 6 days     Minutes of Exercise per Session: 40 min   Stress: No Stress Concern Present (9/9/2021)    [employer] - Occupational Stress Questionnaire     Feeling of Stress : Not at all   Social Connections: Socially Integrated (9/9/2021)    Social Connection and Isolation Panel [NHANES]     Frequency of Communication with Friends and Family: More than three times a week     Frequency of Social Gatherings with Friends and Family: More than three times a week     Attends [employer] Services: More than 4 times per year     Active Member of Clubs or Organizations: Yes     Attends Club or Organization Meetings: Never     Marital Status:    Housing Stability: Unknown (3/10/2023)    Housing Stability Vital Sign     Unstable Housing in the Last Year: No     Current Outpatient Medications   Medication Sig Dispense Refill    azelastine (ASTELIN) 0.1 % nasal spray 2 sprays by Nasal route 2 times daily Use in each nostril as directed 120 mL 0    Red Yeast Rice Extract (RED YEAST RICE PO) Take by mouth      ciclopirox (PENLAC) 8 % solution Apply topically nightly. 6 mL 0    levothyroxine (SYNTHROID) 50 MCG tablet TAKE ONE TABLET BY MOUTH DAILY 90 tablet 1    lisinopril (PRINIVIL;ZESTRIL) 10 MG tablet TAKE 1 TABLET TWICE A  tablet 3    atorvastatin (LIPITOR) 10 MG tablet TAKE 1 TABLET DAILY 90 tablet 3    albuterol sulfate HFA (PROVENTIL HFA) 108 (90 Base) MCG/ACT inhaler Inhale 2 puffs into the lungs every 6 hours as

## 2024-03-06 LAB
ALBUMIN SERPL-MCNC: 4.4 G/DL (ref 3.4–5)
ALP SERPL-CCNC: 91 U/L (ref 40–129)
ALT SERPL-CCNC: 13 U/L (ref 10–40)
AST SERPL-CCNC: 23 U/L (ref 15–37)
BILIRUB DIRECT SERPL-MCNC: <0.2 MG/DL (ref 0–0.3)
BILIRUB INDIRECT SERPL-MCNC: NORMAL MG/DL (ref 0–1)
BILIRUB SERPL-MCNC: <0.2 MG/DL (ref 0–1)
CK SERPL-CCNC: 140 U/L (ref 26–192)
CRP SERPL-MCNC: <3 MG/L (ref 0–5.1)
PROT SERPL-MCNC: 7.8 G/DL (ref 6.4–8.2)
RHEUMATOID FACT SER IA-ACNC: <10 IU/ML

## 2024-04-10 NOTE — TELEPHONE ENCOUNTER
Medication:   Requested Prescriptions     Pending Prescriptions Disp Refills    levothyroxine (SYNTHROID) 50 MCG tablet [Pharmacy Med Name: levothyroxine 50 mcg tablet] 90 tablet 1     Sig: TAKE ONE TABLET BY MOUTH DAILY     Last Filled:  10/30/2023    Last appt: 3/5/2024   Next appt: Visit date not found    Last Thyroid:   Lab Results   Component Value Date/Time    TSH 2.33 02/20/2024 09:28 AM    FT3 2.7 05/18/2015 12:12 PM    T4FREE 1.5 02/20/2024 09:28 AM    Z3OPPZO 9.8 05/18/2015 12:12 PM

## 2024-04-11 RX ORDER — LEVOTHYROXINE SODIUM 0.05 MG/1
TABLET ORAL
Qty: 90 TABLET | Refills: 1 | Status: SHIPPED | OUTPATIENT
Start: 2024-04-11

## 2024-06-05 ENCOUNTER — OFFICE VISIT (OUTPATIENT)
Dept: PRIMARY CARE CLINIC | Age: 64
End: 2024-06-05
Payer: COMMERCIAL

## 2024-06-05 VITALS
OXYGEN SATURATION: 100 % | WEIGHT: 160 LBS | SYSTOLIC BLOOD PRESSURE: 134 MMHG | TEMPERATURE: 97.6 F | DIASTOLIC BLOOD PRESSURE: 78 MMHG | HEART RATE: 75 BPM | BODY MASS INDEX: 27.46 KG/M2 | RESPIRATION RATE: 16 BRPM

## 2024-06-05 DIAGNOSIS — J06.9 UPPER RESPIRATORY TRACT INFECTION, UNSPECIFIED TYPE: Primary | ICD-10-CM

## 2024-06-05 DIAGNOSIS — U09.9 POST COVID-19 CONDITION, UNSPECIFIED: ICD-10-CM

## 2024-06-05 PROCEDURE — 99213 OFFICE O/P EST LOW 20 MIN: CPT | Performed by: NURSE PRACTITIONER

## 2024-06-05 PROCEDURE — 3075F SYST BP GE 130 - 139MM HG: CPT | Performed by: NURSE PRACTITIONER

## 2024-06-05 PROCEDURE — 3078F DIAST BP <80 MM HG: CPT | Performed by: NURSE PRACTITIONER

## 2024-06-05 RX ORDER — AZELASTINE 1 MG/ML
2 SPRAY, METERED NASAL 2 TIMES DAILY
Qty: 120 ML | Refills: 0 | Status: SHIPPED | OUTPATIENT
Start: 2024-06-05

## 2024-06-05 RX ORDER — AMOXICILLIN AND CLAVULANATE POTASSIUM 875; 125 MG/1; MG/1
1 TABLET, FILM COATED ORAL 2 TIMES DAILY
Qty: 20 TABLET | Refills: 0 | Status: SHIPPED | OUTPATIENT
Start: 2024-06-05 | End: 2024-06-15

## 2024-06-05 SDOH — ECONOMIC STABILITY: FOOD INSECURITY: WITHIN THE PAST 12 MONTHS, THE FOOD YOU BOUGHT JUST DIDN'T LAST AND YOU DIDN'T HAVE MONEY TO GET MORE.: NEVER TRUE

## 2024-06-05 SDOH — ECONOMIC STABILITY: FOOD INSECURITY: WITHIN THE PAST 12 MONTHS, YOU WORRIED THAT YOUR FOOD WOULD RUN OUT BEFORE YOU GOT MONEY TO BUY MORE.: NEVER TRUE

## 2024-06-05 SDOH — ECONOMIC STABILITY: INCOME INSECURITY: HOW HARD IS IT FOR YOU TO PAY FOR THE VERY BASICS LIKE FOOD, HOUSING, MEDICAL CARE, AND HEATING?: NOT HARD AT ALL

## 2024-06-05 ASSESSMENT — ENCOUNTER SYMPTOMS
CHEST TIGHTNESS: 0
ABDOMINAL PAIN: 0
SINUS PAIN: 0
NAUSEA: 0
VOMITING: 0
TROUBLE SWALLOWING: 0
WHEEZING: 0
DIARRHEA: 0
SHORTNESS OF BREATH: 0
COUGH: 1
SINUS PRESSURE: 1
SORE THROAT: 0
RHINORRHEA: 0
VOICE CHANGE: 1

## 2024-06-05 NOTE — PROGRESS NOTES
ENCOUNTER DATE: 6/5/2024     NAME: Grisel Elena   AGE: 64 y.o.   GENDER: female   YOB: 1960    Chief Complaint   Patient presents with    Other     Pt tested positive for covid 5/27/24 is testing negative now but is still feeling sick        ASSESSMENT/PLAN:  1. Upper respiratory tract infection, unspecified type  Start on antibiotics for likely secondary infection  Recommend to restart on nasal spray.  Discussed Mucinex  Patient unable to take oral antihistamine due to allergy to cetirizine.  Recommend to increase fluid and rest.  If she feels she is not able to return to work by this weekend, she will call the office  - amoxicillin-clavulanate (AUGMENTIN) 875-125 MG per tablet; Take 1 tablet by mouth 2 times daily for 10 days  Dispense: 20 tablet; Refill: 0  - azelastine (ASTELIN) 0.1 % nasal spray; 2 sprays by Nasal route 2 times daily Use in each nostril as directed  Dispense: 120 mL; Refill: 0    2. Post covid-19 condition, unspecified  Now testing negative.  Recommend vitamin C and zinc, rest and increase fluids.      No follow-ups on file.     HPI:   Patient is here for a sick visit    COVID-positive few weeks ago  Symptomatic treatment  Had fever up to 101.7, headache, fatigue.   States she ran a fever for several weeks.     Has runny nose, nasal congestion. Voice is hoarse.   Coughs occasionally. Some headache. Has sinus pressure  Intermittent ear ache. A lot of nasal drainage and is clear.   No fevers x 4 days. Appetite is low. Everything tastes salty.     Not taking anything otc at this time  Not using nasal spray.   Was taking tylenol.     ROS:  Review of Systems   Constitutional:  Positive for fatigue. Negative for chills and fever.   HENT:  Positive for congestion, sinus pressure and voice change. Negative for ear pain, postnasal drip, rhinorrhea, sinus pain, sore throat and trouble swallowing.    Respiratory:  Positive for cough. Negative for chest tightness, shortness of breath

## 2024-06-10 DIAGNOSIS — I10 ESSENTIAL HYPERTENSION: ICD-10-CM

## 2024-06-10 RX ORDER — LISINOPRIL 10 MG/1
TABLET ORAL
Qty: 180 TABLET | Refills: 3 | Status: SHIPPED | OUTPATIENT
Start: 2024-06-10

## 2024-06-10 RX ORDER — ATORVASTATIN CALCIUM 10 MG/1
TABLET, FILM COATED ORAL
Qty: 90 TABLET | Refills: 3 | Status: SHIPPED | OUTPATIENT
Start: 2024-06-10

## 2024-06-10 NOTE — TELEPHONE ENCOUNTER
Medication:   Requested Prescriptions     Pending Prescriptions Disp Refills    atorvastatin (LIPITOR) 10 MG tablet [Pharmacy Med Name: ATORVASTATIN TABS 10MG] 90 tablet 3     Sig: TAKE 1 TABLET DAILY    lisinopril (PRINIVIL;ZESTRIL) 10 MG tablet [Pharmacy Med Name: LISINOPRIL TABS 10MG] 180 tablet 3     Sig: TAKE 1 TABLET TWICE A DAY     Last Filled:  6/15/23    Last appt: 6/5/2024   Next appt: Visit date not found    Last Lipid:   Lab Results   Component Value Date/Time    CHOL 221 07/03/2023 07:53 AM    TRIG 63 07/03/2023 07:53 AM    HDL 83 07/03/2023 07:53 AM

## 2024-07-16 DIAGNOSIS — J06.9 UPPER RESPIRATORY TRACT INFECTION, UNSPECIFIED TYPE: ICD-10-CM

## 2024-07-16 RX ORDER — AZELASTINE HYDROCHLORIDE 137 UG/1
SPRAY, METERED NASAL
Qty: 30 ML | Refills: 0 | Status: SHIPPED | OUTPATIENT
Start: 2024-07-16

## 2024-07-16 RX ORDER — LEVOTHYROXINE SODIUM 0.05 MG/1
TABLET ORAL
Qty: 90 TABLET | Refills: 0 | Status: SHIPPED | OUTPATIENT
Start: 2024-07-16

## 2024-07-16 NOTE — TELEPHONE ENCOUNTER
Medication:   Requested Prescriptions     Pending Prescriptions Disp Refills    levothyroxine (SYNTHROID) 50 MCG tablet [Pharmacy Med Name: L-THYROXINE (SYNTHROID) TABS 50MCG]  0    Azelastine HCl 137 MCG/SPRAY SOLN [Pharmacy Med Name: AZELASTINE NASAL SPRAY 30ML 0.1% 137MCG]  0     Last Filled:  4.11.24   6.5.24    Last appt: 6/5/2024   Next appt: Visit date not found    Last Thyroid:   Lab Results   Component Value Date/Time    TSH 2.33 02/20/2024 09:28 AM    FT3 2.7 05/18/2015 12:12 PM    T4FREE 1.5 02/20/2024 09:28 AM

## 2024-08-12 DIAGNOSIS — J06.9 UPPER RESPIRATORY TRACT INFECTION, UNSPECIFIED TYPE: ICD-10-CM

## 2024-08-12 RX ORDER — AZELASTINE HYDROCHLORIDE 137 UG/1
SPRAY, METERED NASAL
Qty: 30 ML | Refills: 0 | Status: SHIPPED | OUTPATIENT
Start: 2024-08-12

## 2024-08-12 NOTE — TELEPHONE ENCOUNTER
Medication:   Requested Prescriptions     Pending Prescriptions Disp Refills    Azelastine HCl 137 MCG/SPRAY SOLN 30 mL 0     Si sprays by Nasal route 2 times daily Use in each nostril as directed     Last Filled:  24    Last appt: 2024   Next appt: Visit date not found    Last OARRS:        No data to display

## 2024-08-12 NOTE — TELEPHONE ENCOUNTER
[facility] called and requested 90-day supply refill of pt prescription. [facility] no longer fills refills for less than 35-day supplies. Please review and contact [facility] if needed.    Azelastine HCl 137 MCG/SPRAY SOLN [[id number]]     [facility] - [place], [place] - [address] [place] [address] - P [phone] - F [phone] [address] [place] [id number]  Phone: [phone]  Fax: [phone]

## 2024-10-15 DIAGNOSIS — B35.1 TOENAIL FUNGUS: ICD-10-CM

## 2024-10-16 NOTE — TELEPHONE ENCOUNTER
Medication:   Requested Prescriptions     Pending Prescriptions Disp Refills    ciclopirox (PENLAC) 8 % solution [Pharmacy Med Name: ciclopirox 8 % topical solution] 6.6 mL 0     Sig: APPLY topically TO THE AFFECTED AREA nightly AT BEDTIME     Last filled: 2/20/24  Last appt: 6/5/2024   Next appt: Visit date not found    Last OARRS:        No data to display

## 2024-10-17 RX ORDER — CICLOPIROX 80 MG/ML
SOLUTION TOPICAL
Qty: 6.6 ML | Refills: 0 | Status: SHIPPED | OUTPATIENT
Start: 2024-10-17

## 2024-10-23 RX ORDER — LEVOTHYROXINE SODIUM 50 UG/1
TABLET ORAL
Qty: 90 TABLET | Refills: 3 | Status: SHIPPED | OUTPATIENT
Start: 2024-10-23

## 2024-10-23 NOTE — TELEPHONE ENCOUNTER
Medication:   Requested Prescriptions     Pending Prescriptions Disp Refills    levothyroxine (SYNTHROID) 50 MCG tablet [Pharmacy Med Name: L-THYROXINE (SYNTHROID) TABS 50MCG] 90 tablet 3     Sig: TAKE 1 TABLET DAILY     Last Filled:  7.16.24    Last appt: 6/5/2024   Next appt: 10.28.24    Last Thyroid:   Lab Results   Component Value Date/Time    TSH 2.33 02/20/2024 09:28 AM    FT3 2.7 05/18/2015 12:12 PM    T4FREE 1.5 02/20/2024 09:28 AM

## 2024-10-28 ENCOUNTER — OFFICE VISIT (OUTPATIENT)
Dept: PRIMARY CARE CLINIC | Age: 64
End: 2024-10-28
Payer: COMMERCIAL

## 2024-10-28 VITALS
WEIGHT: 157.8 LBS | HEART RATE: 80 BPM | HEIGHT: 64 IN | BODY MASS INDEX: 26.94 KG/M2 | SYSTOLIC BLOOD PRESSURE: 120 MMHG | DIASTOLIC BLOOD PRESSURE: 80 MMHG | OXYGEN SATURATION: 97 %

## 2024-10-28 DIAGNOSIS — E78.2 MIXED HYPERLIPIDEMIA: ICD-10-CM

## 2024-10-28 DIAGNOSIS — J30.2 SEASONAL ALLERGIC RHINITIS, UNSPECIFIED TRIGGER: ICD-10-CM

## 2024-10-28 DIAGNOSIS — E03.9 ACQUIRED HYPOTHYROIDISM: ICD-10-CM

## 2024-10-28 DIAGNOSIS — I10 ESSENTIAL HYPERTENSION: Primary | ICD-10-CM

## 2024-10-28 PROCEDURE — 3079F DIAST BP 80-89 MM HG: CPT | Performed by: FAMILY MEDICINE

## 2024-10-28 PROCEDURE — 3074F SYST BP LT 130 MM HG: CPT | Performed by: FAMILY MEDICINE

## 2024-10-28 PROCEDURE — 99214 OFFICE O/P EST MOD 30 MIN: CPT | Performed by: FAMILY MEDICINE

## 2024-10-28 ASSESSMENT — ENCOUNTER SYMPTOMS
RESPIRATORY NEGATIVE: 1
GASTROINTESTINAL NEGATIVE: 1
EYES NEGATIVE: 1

## 2024-10-28 NOTE — PROGRESS NOTES
SUBJECTIVE:  Patient ID: Grisel Elena is a 64 y.o. y.o. female     HPI   Hypertension: Patient here for follow-up of elevated blood pressure. She is exercising and is adherent to low salt diet.  Blood pressure is well controlled at home. Cardiac symptoms none. Patient denies chest pain, chest pressure/discomfort, dyspnea, exertional chest pressure/discomfort, fatigue, lower extremity edema, near-syncope, palpitations and syncope.  Cardiovascular risk factors: hypertension. Use of agents associated with hypertension: none. History of target organ damage: none.  Patient with hypothyroidism stable on current dose  Could not tolerate statin she is worried about her cholesterol she does have family history of heart disease  She is willing to different medication  She has been feeling little pressure in ears otherwise she has been doing okay she exercise daily 2 hours minimum  Past Medical History:   Diagnosis Date    Allergic rhinitis     Asthma     Capsulitis of shoulder 2012    left shoulder, Dr Boy Johnson    Depression     Hypertension     Hypothyroid     IBS (irritable bowel syndrome)     Nausea & vomiting     Osteoarthritis     Raynaud's disease 2002    to feet    Sleep apnea       Past Surgical History:   Procedure Laterality Date     SECTION  1990    COLONOSCOPY  2007    Dr Tai    KNEE ARTHROPLASTY      KNEE ARTHROSCOPY Left 2014    LEFT KNEE ARTHROSCOPE WITH PARTIAL MEDIAL MENISCECTOMY, SYNOVECTOMY AND CHONDROPLASTY                                SHOULDER ARTHROSCOPY  2012    left shoulder arthroscopy with subacromial decompression , distal clavicle excision ,synovectomy,labral debridement    SHOULDER ARTHROSCOPY  10/16/2012    RIGHT SHOULDER ARTHROSCOPE, SUBACROMIAL DECOMPRESSION, DISTAL    UPPER GASTROINTESTINAL ENDOSCOPY  2007    Dr Tai     Family History   Problem Relation Age of Onset    Diabetes Mother     High Blood Pressure Mother     High Blood

## 2024-10-29 DIAGNOSIS — E78.2 MIXED HYPERLIPIDEMIA: ICD-10-CM

## 2024-10-29 DIAGNOSIS — E03.9 ACQUIRED HYPOTHYROIDISM: ICD-10-CM

## 2024-10-29 DIAGNOSIS — I10 ESSENTIAL HYPERTENSION: ICD-10-CM

## 2024-10-29 LAB
ALBUMIN SERPL-MCNC: 4.4 G/DL (ref 3.4–5)
ALP SERPL-CCNC: 87 U/L (ref 40–129)
ALT SERPL-CCNC: 14 U/L (ref 10–40)
ANION GAP SERPL CALCULATED.3IONS-SCNC: 9 MMOL/L (ref 3–16)
AST SERPL-CCNC: 22 U/L (ref 15–37)
BASOPHILS # BLD: 0 K/UL (ref 0–0.2)
BASOPHILS NFR BLD: 0.4 %
BILIRUB DIRECT SERPL-MCNC: 0.2 MG/DL (ref 0–0.3)
BILIRUB INDIRECT SERPL-MCNC: 0.1 MG/DL (ref 0–1)
BILIRUB SERPL-MCNC: 0.3 MG/DL (ref 0–1)
BUN SERPL-MCNC: 17 MG/DL (ref 7–20)
CALCIUM SERPL-MCNC: 9.9 MG/DL (ref 8.3–10.6)
CHLORIDE SERPL-SCNC: 103 MMOL/L (ref 99–110)
CHOLEST SERPL-MCNC: 243 MG/DL (ref 0–199)
CO2 SERPL-SCNC: 26 MMOL/L (ref 21–32)
CREAT SERPL-MCNC: 0.9 MG/DL (ref 0.6–1.2)
DEPRECATED RDW RBC AUTO: 14.3 % (ref 12.4–15.4)
EOSINOPHIL # BLD: 0.2 K/UL (ref 0–0.6)
EOSINOPHIL NFR BLD: 2.7 %
GFR SERPLBLD CREATININE-BSD FMLA CKD-EPI: 72 ML/MIN/{1.73_M2}
GLUCOSE SERPL-MCNC: 104 MG/DL (ref 70–99)
HCT VFR BLD AUTO: 36.4 % (ref 36–48)
HDLC SERPL-MCNC: 71 MG/DL (ref 40–60)
HGB BLD-MCNC: 11.8 G/DL (ref 12–16)
LDLC SERPL CALC-MCNC: 154 MG/DL
LYMPHOCYTES # BLD: 3.2 K/UL (ref 1–5.1)
LYMPHOCYTES NFR BLD: 53.7 %
MCH RBC QN AUTO: 26.6 PG (ref 26–34)
MCHC RBC AUTO-ENTMCNC: 32.6 G/DL (ref 31–36)
MCV RBC AUTO: 81.6 FL (ref 80–100)
MONOCYTES # BLD: 0.4 K/UL (ref 0–1.3)
MONOCYTES NFR BLD: 6 %
NEUTROPHILS # BLD: 2.2 K/UL (ref 1.7–7.7)
NEUTROPHILS NFR BLD: 37.2 %
PLATELET # BLD AUTO: 189 K/UL (ref 135–450)
PMV BLD AUTO: 10.4 FL (ref 5–10.5)
POTASSIUM SERPL-SCNC: 4.5 MMOL/L (ref 3.5–5.1)
PROT SERPL-MCNC: 7.4 G/DL (ref 6.4–8.2)
RBC # BLD AUTO: 4.46 M/UL (ref 4–5.2)
SODIUM SERPL-SCNC: 138 MMOL/L (ref 136–145)
TRIGL SERPL-MCNC: 91 MG/DL (ref 0–150)
TSH SERPL DL<=0.005 MIU/L-ACNC: 2.26 UIU/ML (ref 0.27–4.2)
VLDLC SERPL CALC-MCNC: 18 MG/DL
WBC # BLD AUTO: 5.9 K/UL (ref 4–11)

## 2024-11-01 RX ORDER — ROSUVASTATIN CALCIUM 5 MG/1
5 TABLET, COATED ORAL NIGHTLY
Qty: 30 TABLET | Refills: 3 | Status: SHIPPED | OUTPATIENT
Start: 2024-11-01

## 2024-12-20 RX ORDER — ROSUVASTATIN CALCIUM 5 MG/1
5 TABLET, COATED ORAL DAILY
Qty: 90 TABLET | Refills: 0 | Status: SHIPPED | OUTPATIENT
Start: 2024-12-20

## 2024-12-20 NOTE — TELEPHONE ENCOUNTER
Medication:   Requested Prescriptions     Pending Prescriptions Disp Refills    rosuvastatin (CRESTOR) 5 MG tablet [Pharmacy Med Name: ROSUVASTATIN TABS 5MG] 90 tablet 0     Last Filled:  11/1/2024    Last appt: 10/28/2024   Next appt: Visit date not found    Last Lipid:   Lab Results   Component Value Date/Time    CHOL 243 10/29/2024 08:49 AM    TRIG 91 10/29/2024 08:49 AM    HDL 71 10/29/2024 08:49 AM

## 2025-01-27 NOTE — TELEPHONE ENCOUNTER
Refilled.   Spoke with Bao Guo she gave me the msg details on pt's CT it states [facility]#[id number] Valid 8/21/17-11/19/21 Completed per marcello   I spoke with the patient and gave her the information she is going to contact her insurance company

## 2025-05-01 RX ORDER — ROSUVASTATIN CALCIUM 5 MG/1
5 TABLET, COATED ORAL DAILY
Qty: 90 TABLET | Refills: 0 | Status: SHIPPED | OUTPATIENT
Start: 2025-05-01

## 2025-05-01 NOTE — TELEPHONE ENCOUNTER
Medication:   Requested Prescriptions     Pending Prescriptions Disp Refills    rosuvastatin (CRESTOR) 5 MG tablet 90 tablet 0     Sig: Take 1 tablet by mouth daily     Last Filled:      Last appt: 10/28/2024   Next appt: 6/16/2025    Last OARRS:        No data to display

## 2025-06-11 ENCOUNTER — TELEPHONE (OUTPATIENT)
Dept: PRIMARY CARE CLINIC | Age: 65
End: 2025-06-11

## 2025-06-11 DIAGNOSIS — R73.9 HYPERGLYCEMIA: ICD-10-CM

## 2025-06-11 DIAGNOSIS — E03.9 ACQUIRED HYPOTHYROIDISM: ICD-10-CM

## 2025-06-11 DIAGNOSIS — E78.2 MIXED HYPERLIPIDEMIA: ICD-10-CM

## 2025-06-11 DIAGNOSIS — I10 ESSENTIAL HYPERTENSION: Primary | ICD-10-CM

## 2025-06-11 NOTE — TELEPHONE ENCOUNTER
----- Message from Mamadou ROPER sent at 6/11/2025  9:28 AM EDT -----  Regarding: [facility] Appointment Request  E[facility] Message to Provider    Relationship to Patient: Self     Additional Information The patient is asking if she needs to have a blood work or if she need to fast, her appointment would be tomorrow June 12, 2025 at 4 pm   --------------------------------------------------------------------------------------------------------------------------    Call Back Information: OK to leave message on voicemail  Preferred Call Back Number: Phone [phone]

## 2025-06-12 ENCOUNTER — OFFICE VISIT (OUTPATIENT)
Dept: PRIMARY CARE CLINIC | Age: 65
End: 2025-06-12
Payer: COMMERCIAL

## 2025-06-12 VITALS
HEART RATE: 67 BPM | OXYGEN SATURATION: 100 % | SYSTOLIC BLOOD PRESSURE: 136 MMHG | BODY MASS INDEX: 27.31 KG/M2 | WEIGHT: 160 LBS | DIASTOLIC BLOOD PRESSURE: 80 MMHG | HEIGHT: 64 IN

## 2025-06-12 DIAGNOSIS — R05.3 CHRONIC COUGH: ICD-10-CM

## 2025-06-12 DIAGNOSIS — E78.2 MIXED HYPERLIPIDEMIA: ICD-10-CM

## 2025-06-12 DIAGNOSIS — E03.9 ACQUIRED HYPOTHYROIDISM: ICD-10-CM

## 2025-06-12 DIAGNOSIS — I10 ESSENTIAL HYPERTENSION: Primary | ICD-10-CM

## 2025-06-12 PROCEDURE — 3079F DIAST BP 80-89 MM HG: CPT | Performed by: FAMILY MEDICINE

## 2025-06-12 PROCEDURE — 99214 OFFICE O/P EST MOD 30 MIN: CPT | Performed by: FAMILY MEDICINE

## 2025-06-12 PROCEDURE — 3075F SYST BP GE 130 - 139MM HG: CPT | Performed by: FAMILY MEDICINE

## 2025-06-12 RX ORDER — AMLODIPINE BESYLATE 2.5 MG/1
2.5 TABLET ORAL DAILY
Qty: 30 TABLET | Refills: 2 | Status: SHIPPED | OUTPATIENT
Start: 2025-06-12

## 2025-06-12 SDOH — ECONOMIC STABILITY: FOOD INSECURITY: WITHIN THE PAST 12 MONTHS, THE FOOD YOU BOUGHT JUST DIDN'T LAST AND YOU DIDN'T HAVE MONEY TO GET MORE.: NEVER TRUE

## 2025-06-12 SDOH — ECONOMIC STABILITY: FOOD INSECURITY: WITHIN THE PAST 12 MONTHS, YOU WORRIED THAT YOUR FOOD WOULD RUN OUT BEFORE YOU GOT MONEY TO BUY MORE.: NEVER TRUE

## 2025-06-12 ASSESSMENT — PATIENT HEALTH QUESTIONNAIRE - PHQ9
SUM OF ALL RESPONSES TO PHQ QUESTIONS 1-9: 0
1. LITTLE INTEREST OR PLEASURE IN DOING THINGS: NOT AT ALL
SUM OF ALL RESPONSES TO PHQ QUESTIONS 1-9: 0
2. FEELING DOWN, DEPRESSED OR HOPELESS: NOT AT ALL

## 2025-06-12 ASSESSMENT — ENCOUNTER SYMPTOMS
GASTROINTESTINAL NEGATIVE: 1
RESPIRATORY NEGATIVE: 1
EYES NEGATIVE: 1

## 2025-06-12 NOTE — PROGRESS NOTES
SUBJECTIVE:  Patient ID: Grisel Elena is a 65 y.o. y.o. female     HPI   Hypertension: Patient here for follow-up of elevated blood pressure. She is exercising and is adherent to low salt diet.  Blood pressure is well controlled at home. Cardiac symptoms none. Patient denies chest pain, chest pressure/discomfort, dyspnea, exertional chest pressure/discomfort, fatigue, lower extremity edema, near-syncope, palpitations and syncope.  Cardiovascular risk factors: hypertension. Use of agents associated with hypertension: none. History of target organ damage: none.  Patient with hypothyroidism stable on current dose  Upper Respiratory Infection: Patient complains of symptoms of a URI. Symptoms include congestion, cough, and sore throat. Onset of symptoms was a few months ago, unchanged since that time. She also c/o congestion for the past 3months .  She is drinking moderate amounts of fluids. Evaluation to date: none. Treatment to date: none.        Past Medical History:   Diagnosis Date    Allergic rhinitis     Asthma     Capsulitis of shoulder 2012    left shoulder, Dr Brunson Johnson    Depression     Hypertension     Hypothyroid     IBS (irritable bowel syndrome)     Nausea & vomiting     Osteoarthritis     Raynaud's disease 2002    to feet    Sleep apnea       Past Surgical History:   Procedure Laterality Date     SECTION  1990    COLONOSCOPY  2007    Dr Tai    KNEE ARTHROPLASTY      KNEE ARTHROSCOPY Left 2014    LEFT KNEE ARTHROSCOPE WITH PARTIAL MEDIAL MENISCECTOMY, SYNOVECTOMY AND CHONDROPLASTY                                SHOULDER ARTHROSCOPY  2012    left shoulder arthroscopy with subacromial decompression , distal clavicle excision ,synovectomy,labral debridement    SHOULDER ARTHROSCOPY  10/16/2012    RIGHT SHOULDER ARTHROSCOPE, SUBACROMIAL DECOMPRESSION, DISTAL    UPPER GASTROINTESTINAL ENDOSCOPY  2007    Dr Tai     Family History   Problem Relation Age of

## 2025-06-13 DIAGNOSIS — E78.2 MIXED HYPERLIPIDEMIA: ICD-10-CM

## 2025-06-13 DIAGNOSIS — I10 ESSENTIAL HYPERTENSION: ICD-10-CM

## 2025-06-13 DIAGNOSIS — R73.9 HYPERGLYCEMIA: ICD-10-CM

## 2025-06-13 DIAGNOSIS — E03.9 ACQUIRED HYPOTHYROIDISM: ICD-10-CM

## 2025-06-13 LAB
ALBUMIN SERPL-MCNC: 4.4 G/DL (ref 3.4–5)
ALP SERPL-CCNC: 99 U/L (ref 40–129)
ALT SERPL-CCNC: 45 U/L (ref 10–40)
AST SERPL-CCNC: 34 U/L (ref 15–37)
BASOPHILS # BLD: 0 K/UL (ref 0–0.2)
BASOPHILS NFR BLD: 0.6 %
BILIRUB DIRECT SERPL-MCNC: 0.2 MG/DL (ref 0–0.3)
BILIRUB INDIRECT SERPL-MCNC: 0.3 MG/DL (ref 0–1)
BILIRUB SERPL-MCNC: 0.5 MG/DL (ref 0–1)
CHOLEST SERPL-MCNC: 205 MG/DL (ref 0–199)
DEPRECATED RDW RBC AUTO: 13.9 % (ref 12.4–15.4)
EOSINOPHIL # BLD: 0.2 K/UL (ref 0–0.6)
EOSINOPHIL NFR BLD: 2.7 %
HCT VFR BLD AUTO: 37.5 % (ref 36–48)
HDLC SERPL-MCNC: 73 MG/DL (ref 40–60)
HGB BLD-MCNC: 12.3 G/DL (ref 12–16)
LDLC SERPL CALC-MCNC: 119 MG/DL
LYMPHOCYTES # BLD: 3.1 K/UL (ref 1–5.1)
LYMPHOCYTES NFR BLD: 48.1 %
MCH RBC QN AUTO: 26.4 PG (ref 26–34)
MCHC RBC AUTO-ENTMCNC: 32.9 G/DL (ref 31–36)
MCV RBC AUTO: 80.3 FL (ref 80–100)
MONOCYTES # BLD: 0.4 K/UL (ref 0–1.3)
MONOCYTES NFR BLD: 6.7 %
NEUTROPHILS # BLD: 2.7 K/UL (ref 1.7–7.7)
NEUTROPHILS NFR BLD: 41.9 %
PLATELET # BLD AUTO: 236 K/UL (ref 135–450)
PMV BLD AUTO: 10.2 FL (ref 5–10.5)
PROT SERPL-MCNC: 7.7 G/DL (ref 6.4–8.2)
RBC # BLD AUTO: 4.66 M/UL (ref 4–5.2)
TRIGL SERPL-MCNC: 64 MG/DL (ref 0–150)
TSH SERPL DL<=0.005 MIU/L-ACNC: 2.62 UIU/ML (ref 0.27–4.2)
VLDLC SERPL CALC-MCNC: 13 MG/DL
WBC # BLD AUTO: 6.5 K/UL (ref 4–11)

## 2025-06-14 LAB
EST. AVERAGE GLUCOSE BLD GHB EST-MCNC: 134.1 MG/DL
HBA1C MFR BLD: 6.3 %

## 2025-06-16 ENCOUNTER — TELEPHONE (OUTPATIENT)
Dept: PRIMARY CARE CLINIC | Age: 65
End: 2025-06-16

## 2025-06-16 ENCOUNTER — RESULTS FOLLOW-UP (OUTPATIENT)
Dept: PRIMARY CARE CLINIC | Age: 65
End: 2025-06-16

## 2025-06-16 RX ORDER — METFORMIN HYDROCHLORIDE 500 MG/1
500 TABLET, EXTENDED RELEASE ORAL
Qty: 30 TABLET | Refills: 2 | Status: SHIPPED | OUTPATIENT
Start: 2025-06-16 | End: 2025-07-30

## 2025-06-16 NOTE — TELEPHONE ENCOUNTER
----- Message from goldy ORA sent at 6/16/2025  8:43 AM EDT -----  Regarding: E[facility] Message to Provider  ECC Message to Provider    Relationship to Patient: Self     Additional Information patient got her lipid test result and asking if her pcp Radha Melendez MD already read it and asking for the next step.  --------------------------------------------------------------------------------------------------------------------------    Call Back Information: OK to leave message on voicemail  Preferred Call Back Number: Phone [phone]

## 2025-07-28 ENCOUNTER — OFFICE VISIT (OUTPATIENT)
Dept: PRIMARY CARE CLINIC | Age: 65
End: 2025-07-28
Payer: COMMERCIAL

## 2025-07-28 VITALS
DIASTOLIC BLOOD PRESSURE: 74 MMHG | WEIGHT: 159.2 LBS | SYSTOLIC BLOOD PRESSURE: 138 MMHG | OXYGEN SATURATION: 99 % | BODY MASS INDEX: 27.33 KG/M2 | HEART RATE: 60 BPM

## 2025-07-28 DIAGNOSIS — E03.9 ACQUIRED HYPOTHYROIDISM: ICD-10-CM

## 2025-07-28 DIAGNOSIS — Z23 NEED FOR TDAP VACCINATION: ICD-10-CM

## 2025-07-28 DIAGNOSIS — Z12.31 ENCOUNTER FOR SCREENING MAMMOGRAM FOR MALIGNANT NEOPLASM OF BREAST: ICD-10-CM

## 2025-07-28 DIAGNOSIS — E78.2 MIXED HYPERLIPIDEMIA: ICD-10-CM

## 2025-07-28 DIAGNOSIS — I10 ESSENTIAL HYPERTENSION: Primary | ICD-10-CM

## 2025-07-28 PROCEDURE — 3075F SYST BP GE 130 - 139MM HG: CPT | Performed by: FAMILY MEDICINE

## 2025-07-28 PROCEDURE — 90471 IMMUNIZATION ADMIN: CPT | Performed by: FAMILY MEDICINE

## 2025-07-28 PROCEDURE — 90715 TDAP VACCINE 7 YRS/> IM: CPT | Performed by: FAMILY MEDICINE

## 2025-07-28 PROCEDURE — 3078F DIAST BP <80 MM HG: CPT | Performed by: FAMILY MEDICINE

## 2025-07-28 PROCEDURE — 99214 OFFICE O/P EST MOD 30 MIN: CPT | Performed by: FAMILY MEDICINE

## 2025-07-28 ASSESSMENT — ENCOUNTER SYMPTOMS
GASTROINTESTINAL NEGATIVE: 1
EYES NEGATIVE: 1
RESPIRATORY NEGATIVE: 1

## 2025-07-28 NOTE — PROGRESS NOTES
SUBJECTIVE:  Patient ID: Grisel Elena is a 65 y.o. y.o. female     HPI   Hypertension: Patient here for follow-up of elevated blood pressure. She is exercising and is adherent to low salt diet.  Blood pressure is well controlled at home. Cardiac symptoms none. Patient denies chest pain, chest pressure/discomfort, dyspnea, exertional chest pressure/discomfort, fatigue, lower extremity edema, near-syncope, palpitations and syncope.  Cardiovascular risk factors: hypertension. Use of agents associated with hypertension: none. History of target organ damage: none.  Patient with hypothyroidism stable on current dose          Past Medical History:   Diagnosis Date    Allergic rhinitis     Asthma     Capsulitis of shoulder 2012    left shoulder, Dr Boy Johnson    Depression     Hypertension     Hypothyroid     IBS (irritable bowel syndrome)     Nausea & vomiting     Osteoarthritis     Raynaud's disease 2002    to feet    Sleep apnea       Past Surgical History:   Procedure Laterality Date     SECTION  1990    COLONOSCOPY  2007    Dr Tai    KNEE ARTHROPLASTY      KNEE ARTHROSCOPY Left 2014    LEFT KNEE ARTHROSCOPE WITH PARTIAL MEDIAL MENISCECTOMY, SYNOVECTOMY AND CHONDROPLASTY                                SHOULDER ARTHROSCOPY  2012    left shoulder arthroscopy with subacromial decompression , distal clavicle excision ,synovectomy,labral debridement    SHOULDER ARTHROSCOPY  10/16/2012    RIGHT SHOULDER ARTHROSCOPE, SUBACROMIAL DECOMPRESSION, DISTAL    UPPER GASTROINTESTINAL ENDOSCOPY  2007    Dr Tai     Family History   Problem Relation Age of Onset    Diabetes Mother     High Blood Pressure Mother     High Blood Pressure Father     Heart Disease Father     High Blood Pressure Maternal Grandfather     High Blood Pressure Paternal Grandfather      Social History     Socioeconomic History    Marital status:      Spouse name: None    Number of children: None    Years